# Patient Record
Sex: FEMALE | Race: WHITE | NOT HISPANIC OR LATINO | URBAN - METROPOLITAN AREA
[De-identification: names, ages, dates, MRNs, and addresses within clinical notes are randomized per-mention and may not be internally consistent; named-entity substitution may affect disease eponyms.]

---

## 2017-12-21 ENCOUNTER — EMERGENCY (EMERGENCY)
Facility: HOSPITAL | Age: 42
LOS: 0 days | Discharge: HOME | End: 2017-12-22

## 2017-12-21 DIAGNOSIS — R10.9 UNSPECIFIED ABDOMINAL PAIN: ICD-10-CM

## 2017-12-21 DIAGNOSIS — R19.7 DIARRHEA, UNSPECIFIED: ICD-10-CM

## 2017-12-21 DIAGNOSIS — J18.9 PNEUMONIA, UNSPECIFIED ORGANISM: ICD-10-CM

## 2017-12-21 DIAGNOSIS — R11.0 NAUSEA: ICD-10-CM

## 2017-12-21 PROBLEM — Z00.00 ENCOUNTER FOR PREVENTIVE HEALTH EXAMINATION: Status: ACTIVE | Noted: 2017-12-21

## 2018-01-11 ENCOUNTER — APPOINTMENT (OUTPATIENT)
Dept: SURGERY | Facility: CLINIC | Age: 43
End: 2018-01-11
Payer: MEDICAID

## 2018-01-11 VITALS — BODY MASS INDEX: 20.89 KG/M2 | HEIGHT: 66 IN | WEIGHT: 130 LBS

## 2018-01-11 DIAGNOSIS — K40.90 UNILATERAL INGUINAL HERNIA, W/OUT OBSTRUCTION OR GANGRENE, NOT SPECIFIED AS RECURRENT: ICD-10-CM

## 2018-01-11 PROCEDURE — 99203 OFFICE O/P NEW LOW 30 MIN: CPT

## 2018-02-19 ENCOUNTER — APPOINTMENT (OUTPATIENT)
Dept: SURGERY | Facility: AMBULATORY SURGERY CENTER | Age: 43
End: 2018-02-19

## 2018-02-27 ENCOUNTER — APPOINTMENT (OUTPATIENT)
Dept: SURGERY | Facility: CLINIC | Age: 43
End: 2018-02-27

## 2018-03-26 ENCOUNTER — APPOINTMENT (OUTPATIENT)
Dept: SURGERY | Facility: AMBULATORY SURGERY CENTER | Age: 43
End: 2018-03-26

## 2018-08-03 ENCOUNTER — APPOINTMENT (OUTPATIENT)
Dept: GASTROENTEROLOGY | Facility: CLINIC | Age: 43
End: 2018-08-03

## 2018-08-31 ENCOUNTER — EMERGENCY (EMERGENCY)
Facility: HOSPITAL | Age: 43
LOS: 0 days | Discharge: HOME | End: 2018-08-31
Attending: EMERGENCY MEDICINE | Admitting: EMERGENCY MEDICINE

## 2018-08-31 VITALS
HEART RATE: 81 BPM | RESPIRATION RATE: 18 BRPM | TEMPERATURE: 98 F | SYSTOLIC BLOOD PRESSURE: 176 MMHG | DIASTOLIC BLOOD PRESSURE: 114 MMHG | OXYGEN SATURATION: 98 %

## 2018-08-31 VITALS
RESPIRATION RATE: 18 BRPM | DIASTOLIC BLOOD PRESSURE: 78 MMHG | TEMPERATURE: 98 F | HEART RATE: 82 BPM | SYSTOLIC BLOOD PRESSURE: 140 MMHG | OXYGEN SATURATION: 98 %

## 2018-08-31 DIAGNOSIS — D25.9 LEIOMYOMA OF UTERUS, UNSPECIFIED: ICD-10-CM

## 2018-08-31 DIAGNOSIS — K29.60 OTHER GASTRITIS WITHOUT BLEEDING: ICD-10-CM

## 2018-08-31 DIAGNOSIS — R10.9 UNSPECIFIED ABDOMINAL PAIN: ICD-10-CM

## 2018-08-31 DIAGNOSIS — R11.0 NAUSEA: ICD-10-CM

## 2018-08-31 LAB
ALBUMIN SERPL ELPH-MCNC: 4.8 G/DL — SIGNIFICANT CHANGE UP (ref 3.5–5.2)
ALP SERPL-CCNC: 66 U/L — SIGNIFICANT CHANGE UP (ref 30–115)
ALT FLD-CCNC: 18 U/L — SIGNIFICANT CHANGE UP (ref 0–41)
ANION GAP SERPL CALC-SCNC: 18 MMOL/L — HIGH (ref 7–14)
APPEARANCE UR: ABNORMAL
AST SERPL-CCNC: 17 U/L — SIGNIFICANT CHANGE UP (ref 0–41)
BACTERIA # UR AUTO: ABNORMAL /HPF
BASOPHILS # BLD AUTO: 0.05 K/UL — SIGNIFICANT CHANGE UP (ref 0–0.2)
BASOPHILS NFR BLD AUTO: 0.6 % — SIGNIFICANT CHANGE UP (ref 0–1)
BILIRUB DIRECT SERPL-MCNC: 0.2 MG/DL — SIGNIFICANT CHANGE UP (ref 0–0.2)
BILIRUB INDIRECT FLD-MCNC: 0.4 MG/DL — SIGNIFICANT CHANGE UP (ref 0.2–1.2)
BILIRUB SERPL-MCNC: 0.6 MG/DL — SIGNIFICANT CHANGE UP (ref 0.2–1.2)
BILIRUB UR-MCNC: NEGATIVE — SIGNIFICANT CHANGE UP
BUN SERPL-MCNC: 12 MG/DL — SIGNIFICANT CHANGE UP (ref 10–20)
CALCIUM SERPL-MCNC: 9.6 MG/DL — SIGNIFICANT CHANGE UP (ref 8.5–10.1)
CHLORIDE SERPL-SCNC: 100 MMOL/L — SIGNIFICANT CHANGE UP (ref 98–110)
CO2 SERPL-SCNC: 24 MMOL/L — SIGNIFICANT CHANGE UP (ref 17–32)
COLOR SPEC: YELLOW — SIGNIFICANT CHANGE UP
CREAT SERPL-MCNC: 0.8 MG/DL — SIGNIFICANT CHANGE UP (ref 0.7–1.5)
DIFF PNL FLD: NEGATIVE — SIGNIFICANT CHANGE UP
EOSINOPHIL # BLD AUTO: 0.19 K/UL — SIGNIFICANT CHANGE UP (ref 0–0.7)
EOSINOPHIL NFR BLD AUTO: 2.3 % — SIGNIFICANT CHANGE UP (ref 0–8)
EPI CELLS # UR: ABNORMAL /HPF
GLUCOSE SERPL-MCNC: 106 MG/DL — HIGH (ref 70–99)
GLUCOSE UR QL: NEGATIVE — SIGNIFICANT CHANGE UP
HCT VFR BLD CALC: 40.3 % — SIGNIFICANT CHANGE UP (ref 37–47)
HGB BLD-MCNC: 13.4 G/DL — SIGNIFICANT CHANGE UP (ref 12–16)
IMM GRANULOCYTES NFR BLD AUTO: 0.2 % — SIGNIFICANT CHANGE UP (ref 0.1–0.3)
KETONES UR-MCNC: 15
LACTATE SERPL-SCNC: 0.5 MMOL/L — SIGNIFICANT CHANGE UP (ref 0.5–2.2)
LEUKOCYTE ESTERASE UR-ACNC: NEGATIVE — SIGNIFICANT CHANGE UP
LIDOCAIN IGE QN: 18 U/L — SIGNIFICANT CHANGE UP (ref 7–60)
LYMPHOCYTES # BLD AUTO: 1.45 K/UL — SIGNIFICANT CHANGE UP (ref 1.2–3.4)
LYMPHOCYTES # BLD AUTO: 17.5 % — LOW (ref 20.5–51.1)
MAGNESIUM SERPL-MCNC: 2.2 MG/DL — SIGNIFICANT CHANGE UP (ref 1.8–2.4)
MCHC RBC-ENTMCNC: 29.1 PG — SIGNIFICANT CHANGE UP (ref 27–31)
MCHC RBC-ENTMCNC: 33.3 G/DL — SIGNIFICANT CHANGE UP (ref 32–37)
MCV RBC AUTO: 87.6 FL — SIGNIFICANT CHANGE UP (ref 81–99)
MONOCYTES # BLD AUTO: 0.47 K/UL — SIGNIFICANT CHANGE UP (ref 0.1–0.6)
MONOCYTES NFR BLD AUTO: 5.7 % — SIGNIFICANT CHANGE UP (ref 1.7–9.3)
NEUTROPHILS # BLD AUTO: 6.09 K/UL — SIGNIFICANT CHANGE UP (ref 1.4–6.5)
NEUTROPHILS NFR BLD AUTO: 73.7 % — SIGNIFICANT CHANGE UP (ref 42.2–75.2)
NITRITE UR-MCNC: NEGATIVE — SIGNIFICANT CHANGE UP
NRBC # BLD: 0 /100 WBCS — SIGNIFICANT CHANGE UP (ref 0–0)
PH UR: 6 — SIGNIFICANT CHANGE UP (ref 5–8)
PLATELET # BLD AUTO: 221 K/UL — SIGNIFICANT CHANGE UP (ref 130–400)
POTASSIUM SERPL-MCNC: 4.3 MMOL/L — SIGNIFICANT CHANGE UP (ref 3.5–5)
POTASSIUM SERPL-SCNC: 4.3 MMOL/L — SIGNIFICANT CHANGE UP (ref 3.5–5)
PROT SERPL-MCNC: 7.5 G/DL — SIGNIFICANT CHANGE UP (ref 6–8)
PROT UR-MCNC: 30
RBC # BLD: 4.6 M/UL — SIGNIFICANT CHANGE UP (ref 4.2–5.4)
RBC # FLD: 13.1 % — SIGNIFICANT CHANGE UP (ref 11.5–14.5)
SODIUM SERPL-SCNC: 142 MMOL/L — SIGNIFICANT CHANGE UP (ref 135–146)
SP GR SPEC: >=1.03 — SIGNIFICANT CHANGE UP (ref 1.01–1.03)
UROBILINOGEN FLD QL: 0.2 — SIGNIFICANT CHANGE UP (ref 0.2–0.2)
WBC # BLD: 8.27 K/UL — SIGNIFICANT CHANGE UP (ref 4.8–10.8)
WBC # FLD AUTO: 8.27 K/UL — SIGNIFICANT CHANGE UP (ref 4.8–10.8)

## 2018-08-31 RX ORDER — ACETAMINOPHEN 500 MG
650 TABLET ORAL ONCE
Qty: 0 | Refills: 0 | Status: COMPLETED | OUTPATIENT
Start: 2018-08-31 | End: 2018-08-31

## 2018-08-31 RX ORDER — KETOROLAC TROMETHAMINE 30 MG/ML
15 SYRINGE (ML) INJECTION ONCE
Qty: 0 | Refills: 0 | Status: DISCONTINUED | OUTPATIENT
Start: 2018-08-31 | End: 2018-08-31

## 2018-08-31 RX ADMIN — Medication 650 MILLIGRAM(S): at 21:08

## 2018-08-31 RX ADMIN — Medication 15 MILLIGRAM(S): at 17:10

## 2018-08-31 RX ADMIN — Medication 15 MILLIGRAM(S): at 17:06

## 2018-08-31 NOTE — ED PROVIDER NOTE - NS ED ROS FT
ROS: No fever/chills, No headache/photophobia/eye pain/changes in vision, No ear pain/sore throat/dysphagia, No chest pain/palpitations, no SOB/cough/wheeze/stridor, No /V/D/melena, no dysuria/frequency/discharge, No neck/back pain, no rash, no changes in neurological status/function.    +abdominal pain

## 2018-08-31 NOTE — ED PROVIDER NOTE - CARE PLAN
Principal Discharge DX:	Uterine leiomyoma, unspecified location  Secondary Diagnosis:	Other gastritis without hemorrhage

## 2018-08-31 NOTE — ED ADULT NURSE NOTE - NSIMPLEMENTINTERV_GEN_ALL_ED
Implemented All Universal Safety Interventions:  [place] to call system. Call bell, personal items and telephone within reach. Instruct patient to call for assistance. Room bathroom lighting operational. Non-slip footwear when patient is off stretcher. Physically safe environment: no spills, clutter or unnecessary equipment. Stretcher in lowest position, wheels locked, appropriate side rails in place.

## 2018-08-31 NOTE — ED PROVIDER NOTE - OBJECTIVE STATEMENT
42y/o F w/ hx HPV and recent UTI (last cipro dose yesterday) presents w/ crampy abdominal pain on and off for 1 month, now steady since yesterday. pt denies dysuria, freq.  pain did not improve w/ antibiotics.  Pt denies nausea, vomiting, diarrhea.  pt denies trauma, only social alcohol abuse. LMP 3 weeks ago, sexually active, no hx of stds.  recent work up by GYN diagnosed her with hpv.  no blood in urine

## 2018-08-31 NOTE — ED PROVIDER NOTE - PROGRESS NOTE DETAILS
I personally evaluated the patient. I reviewed the Resident’s note (as assigned above), and agree with the findings and plan except as documented in my note.   44 y/o F PMHx HPV and recent UTI presents with crampy abdominal pain. Pt notes she was having lower abdominal pain for about 1 month and went to see her GYN who DX with UTI last week. Pt took Cipro x7 days but notes still having this crampy pain. Pt notes some nausea, but no vomiting, fevers, or chills. No trauma. Currently with no urinary SX. Denies anorexia, change in stool habits, or similar pain in past. LMP x3 weeks ago and normal. PE: Well appearing, NAD. VS noted. Abdomen soft, diffuse  TTP, no rebound or guarding. No CVAT. Pt had full GYN exam with her GYN last week. A/P: Labs, CT, US, urine, and reassess. Results discussed with patient, copy of results provided. Patient understands to follow up with her gynecologist.

## 2018-08-31 NOTE — ED PROVIDER NOTE - MEDICAL DECISION MAKING DETAILS
labs and imaging discussed with pt at length, pt given print outs of labs and imaging. Aware to follow up with gyn and gi, return precautions discussed at length, pt understands and agrees with plan

## 2018-08-31 NOTE — ED ADULT NURSE NOTE - OBJECTIVE STATEMENT
Pt complaining of dx of uti with completion of po abx cipro for 7 days. Pt now complains of increased pain to lower abdomen and left flank with mild nausea.

## 2018-08-31 NOTE — ED ADULT TRIAGE NOTE - CHIEF COMPLAINT QUOTE
patient states she had a UTI completed 7 days of cipro, patient c/o lower back pain and abdominal pain

## 2018-09-01 LAB
CULTURE RESULTS: NO GROWTH — SIGNIFICANT CHANGE UP
SPECIMEN SOURCE: SIGNIFICANT CHANGE UP

## 2018-10-18 ENCOUNTER — OUTPATIENT (OUTPATIENT)
Dept: OUTPATIENT SERVICES | Facility: HOSPITAL | Age: 43
LOS: 1 days | Discharge: HOME | End: 2018-10-18

## 2018-10-18 ENCOUNTER — RESULT REVIEW (OUTPATIENT)
Age: 43
End: 2018-10-18

## 2018-10-18 VITALS
SYSTOLIC BLOOD PRESSURE: 132 MMHG | RESPIRATION RATE: 18 BRPM | WEIGHT: 132.06 LBS | HEIGHT: 66 IN | TEMPERATURE: 98 F | HEART RATE: 72 BPM | DIASTOLIC BLOOD PRESSURE: 100 MMHG

## 2018-10-18 VITALS — DIASTOLIC BLOOD PRESSURE: 70 MMHG | RESPIRATION RATE: 18 BRPM | SYSTOLIC BLOOD PRESSURE: 145 MMHG | HEART RATE: 78 BPM

## 2018-10-18 NOTE — H&P ADULT - ASSESSMENT
43 year old female is here for elective EGD with EUS for evaluation for an antral subepithelial lesion.

## 2018-10-18 NOTE — ASU DISCHARGE PLAN (ADULT/PEDIATRIC). - NOTIFY
Persistent Nausea and Vomiting/Fever greater than 101/Bleeding that does not stop/Inability to Tolerate Liquids or Foods/Pain not relieved by Medications/Excessive Diarrhea

## 2018-10-19 ENCOUNTER — RESULT REVIEW (OUTPATIENT)
Age: 43
End: 2018-10-19

## 2018-10-22 LAB — SURGICAL PATHOLOGY STUDY: SIGNIFICANT CHANGE UP

## 2018-10-23 LAB — NON-GYNECOLOGICAL CYTOLOGY STUDY: SIGNIFICANT CHANGE UP

## 2018-10-31 PROBLEM — K21.9 GASTRO-ESOPHAGEAL REFLUX DISEASE WITHOUT ESOPHAGITIS: Chronic | Status: ACTIVE | Noted: 2018-10-18

## 2018-10-31 PROBLEM — K25.9 GASTRIC ULCER, UNSPECIFIED AS ACUTE OR CHRONIC, WITHOUT HEMORRHAGE OR PERFORATION: Chronic | Status: ACTIVE | Noted: 2018-10-18

## 2018-11-02 DIAGNOSIS — K29.60 OTHER GASTRITIS WITHOUT BLEEDING: ICD-10-CM

## 2018-11-02 DIAGNOSIS — F10.10 ALCOHOL ABUSE, UNCOMPLICATED: ICD-10-CM

## 2018-11-02 DIAGNOSIS — K25.9 GASTRIC ULCER, UNSPECIFIED AS ACUTE OR CHRONIC, WITHOUT HEMORRHAGE OR PERFORATION: ICD-10-CM

## 2018-11-02 DIAGNOSIS — K31.89 OTHER DISEASES OF STOMACH AND DUODENUM: ICD-10-CM

## 2018-11-05 ENCOUNTER — APPOINTMENT (OUTPATIENT)
Dept: SURGERY | Facility: CLINIC | Age: 43
End: 2018-11-05
Payer: MEDICAID

## 2018-11-05 VITALS
DIASTOLIC BLOOD PRESSURE: 78 MMHG | TEMPERATURE: 98.6 F | WEIGHT: 140 LBS | HEIGHT: 66 IN | HEART RATE: 69 BPM | BODY MASS INDEX: 22.5 KG/M2 | SYSTOLIC BLOOD PRESSURE: 130 MMHG

## 2018-11-05 PROCEDURE — 99213 OFFICE O/P EST LOW 20 MIN: CPT

## 2018-11-05 RX ORDER — PANTOPRAZOLE 40 MG/1
40 TABLET, DELAYED RELEASE ORAL
Refills: 0 | Status: ACTIVE | COMMUNITY

## 2019-07-25 ENCOUNTER — TRANSCRIPTION ENCOUNTER (OUTPATIENT)
Age: 44
End: 2019-07-25

## 2019-10-30 ENCOUNTER — TRANSCRIPTION ENCOUNTER (OUTPATIENT)
Age: 44
End: 2019-10-30

## 2019-12-05 ENCOUNTER — TRANSCRIPTION ENCOUNTER (OUTPATIENT)
Age: 44
End: 2019-12-05

## 2020-02-05 ENCOUNTER — RX RENEWAL (OUTPATIENT)
Age: 45
End: 2020-02-05

## 2020-02-26 ENCOUNTER — APPOINTMENT (OUTPATIENT)
Dept: GASTROENTEROLOGY | Facility: CLINIC | Age: 45
End: 2020-02-26

## 2020-07-13 ENCOUNTER — TRANSCRIPTION ENCOUNTER (OUTPATIENT)
Age: 45
End: 2020-07-13

## 2020-09-01 ENCOUNTER — TRANSCRIPTION ENCOUNTER (OUTPATIENT)
Age: 45
End: 2020-09-01

## 2021-04-19 ENCOUNTER — TRANSCRIPTION ENCOUNTER (OUTPATIENT)
Age: 46
End: 2021-04-19

## 2021-05-18 ENCOUNTER — TRANSCRIPTION ENCOUNTER (OUTPATIENT)
Age: 46
End: 2021-05-18

## 2021-06-17 ENCOUNTER — TRANSCRIPTION ENCOUNTER (OUTPATIENT)
Age: 46
End: 2021-06-17

## 2022-01-03 NOTE — ASU PATIENT PROFILE, ADULT - PRO ARRIVE FROM
Problem: Adult Inpatient Plan of Care  Goal: Plan of Care Review  Outcome: Ongoing, Progressing  Flowsheets (Taken 1/3/2022 1822)  Progress: improving  Plan of Care Reviewed With: patient  Outcome Summary:   Pt is on 4L humidified NC, maintaining SATS in the 90's   pt worked with PT/ST today with no improvement, pt remains very weak   hand wound is improving   pt maintains high spirits and knows it will just take tie to get stronger   will continue to monitor/encourage pt.   Goal Outcome Evaluation:  Plan of Care Reviewed With: patient        Progress: improving  Outcome Summary: Pt is on 4L humidified NC, maintaining SATS in the 90's; pt worked with PT/ST today with no improvement, pt remains very weak; hand wound is improving; pt maintains high spirits and knows it will just take tie to get stronger; will continue to monitor/encourage pt.   home

## 2022-03-22 RX ORDER — PANTOPRAZOLE SODIUM 40 MG/1
40 TABLET, DELAYED RELEASE ORAL
Refills: 0 | Status: DISCONTINUED | COMMUNITY
End: 2022-03-22

## 2022-03-29 ENCOUNTER — APPOINTMENT (OUTPATIENT)
Dept: GASTROENTEROLOGY | Facility: CLINIC | Age: 47
End: 2022-03-29
Payer: MEDICAID

## 2022-03-29 DIAGNOSIS — R11.0 NAUSEA: ICD-10-CM

## 2022-03-29 DIAGNOSIS — Z78.9 OTHER SPECIFIED HEALTH STATUS: ICD-10-CM

## 2022-03-29 DIAGNOSIS — Z80.3 FAMILY HISTORY OF MALIGNANT NEOPLASM OF BREAST: ICD-10-CM

## 2022-03-29 DIAGNOSIS — Z86.39 PERSONAL HISTORY OF OTHER ENDOCRINE, NUTRITIONAL AND METABOLIC DISEASE: ICD-10-CM

## 2022-03-29 DIAGNOSIS — R10.13 EPIGASTRIC PAIN: ICD-10-CM

## 2022-03-29 DIAGNOSIS — K25.9 GASTRIC ULCER, UNSPECIFIED AS ACUTE OR CHRONIC, W/OUT HEMORRHAGE OR PERFORATION: ICD-10-CM

## 2022-03-29 DIAGNOSIS — Z12.11 ENCOUNTER FOR SCREENING FOR MALIGNANT NEOPLASM OF COLON: ICD-10-CM

## 2022-03-29 PROCEDURE — 99204 OFFICE O/P NEW MOD 45 MIN: CPT | Mod: 95

## 2022-03-29 RX ORDER — NORETHINDRONE ACETATE AND ETHINYL ESTRADIOL AND FERROUS FUMARATE 1MG-20(24)
KIT ORAL
Refills: 0 | Status: ACTIVE | COMMUNITY

## 2022-03-29 RX ORDER — LEVOTHYROXINE SODIUM 0.05 MG/1
50 TABLET ORAL
Refills: 0 | Status: ACTIVE | COMMUNITY

## 2022-03-29 NOTE — ASSESSMENT
[FreeTextEntry1] : 46 year old female patient average risk for CRC, presents with few months history of epigastric pain that often woke her up at night, has had EGDs with [facility] Mercado and Mendoza and a negative EUS for malignancy. Also had equivocal result for H Pylori. \par No weight loss, dysphagia, but has GERD responsive to PPI. \par Reports occasional rectal bleeding due to a hemorrhoid. Never had colonoscopy. \par \par CRC screening colonoscopy\par Hx of non healing gastric ulcers, now with epigastric pain\par Needs EGD, screening colonoscopy\par Risks and benefits discussed with patient.\par Hemorrhoids: anusol HC suppo daily at [facility]\par

## 2022-03-29 NOTE — PHYSICAL EXAM
[General Appearance - Alert] : alert [Hearing Threshold Finger Rub Not [place]] : hearing was normal [] : no respiratory distress [Skin Color & Pigmentation] : normal skin color and pigmentation [Oriented To Time, Place, And Person] : oriented to person, place, and time

## 2022-03-29 NOTE — HISTORY OF PRESENT ILLNESS
[Home] : at home, [unfilled] , at the time of the visit. [Medical Office: ([facility])___] : at the medical office located in  [Verbal consent obtained from patient] : the patient, [unfilled] [FreeTextEntry4] : Krupa Herrera  [de-identified] : 46 year old female patient average risk for CRC, presents with few months history of epigastric pain that often woke her up at night, has had EGDs with Rocky Mercado and Mendoza and a negative EUS for malignancy. Also had equivocal result for H Pylori. \par No weight loss, dysphagia, but has GERD responsive to PPI. \par Reports occasional rectal bleeding due to a hemorrhoid. Never had colonoscopy.

## 2022-04-13 ENCOUNTER — TRANSCRIPTION ENCOUNTER (OUTPATIENT)
Age: 47
End: 2022-04-13

## 2022-06-24 ENCOUNTER — LABORATORY RESULT (OUTPATIENT)
Age: 47
End: 2022-06-24

## 2022-06-27 ENCOUNTER — RESULT REVIEW (OUTPATIENT)
Age: 47
End: 2022-06-27

## 2022-06-27 ENCOUNTER — TRANSCRIPTION ENCOUNTER (OUTPATIENT)
Age: 47
End: 2022-06-27

## 2022-06-27 ENCOUNTER — OUTPATIENT (OUTPATIENT)
Dept: OUTPATIENT SERVICES | Facility: HOSPITAL | Age: 47
LOS: 1 days | Discharge: HOME | End: 2022-06-27
Payer: MEDICAID

## 2022-06-27 VITALS
HEART RATE: 77 BPM | SYSTOLIC BLOOD PRESSURE: 133 MMHG | TEMPERATURE: 97 F | DIASTOLIC BLOOD PRESSURE: 81 MMHG | WEIGHT: 130.07 LBS | RESPIRATION RATE: 17 BRPM | OXYGEN SATURATION: 99 % | HEIGHT: 65 IN

## 2022-06-27 VITALS — SYSTOLIC BLOOD PRESSURE: 112 MMHG | RESPIRATION RATE: 15 BRPM | DIASTOLIC BLOOD PRESSURE: 59 MMHG | HEART RATE: 70 BPM

## 2022-06-27 PROCEDURE — 45380 COLONOSCOPY AND BIOPSY: CPT

## 2022-06-27 PROCEDURE — 43239 EGD BIOPSY SINGLE/MULTIPLE: CPT | Mod: XS

## 2022-06-27 PROCEDURE — 88312 SPECIAL STAINS GROUP 1: CPT | Mod: 26

## 2022-06-27 PROCEDURE — 88305 TISSUE EXAM BY PATHOLOGIST: CPT | Mod: 26

## 2022-06-27 NOTE — ASU PATIENT PROFILE, ADULT - NSICDXPASTMEDICALHX_GEN_ALL_CORE_FT
PAST MEDICAL HISTORY:  Autoimmune deficiency syndrome     Gastric ulcer     GERD (gastroesophageal reflux disease)     Hypothyroidism

## 2022-06-27 NOTE — ASU DISCHARGE PLAN (ADULT/PEDIATRIC) - CARE PROVIDER_API CALL
Edwina Chavarria (MD)  Gastroenterology  [address] [place] [address]  Phone: [phone]  Fax: [phone]  Follow Up Time:

## 2022-06-27 NOTE — ASU PATIENT PROFILE, ADULT - FALL HARM RISK - HARM RISK INTERVENTIONS

## 2022-07-04 LAB — SURGICAL PATHOLOGY STUDY: SIGNIFICANT CHANGE UP

## 2022-07-07 LAB — SURGICAL PATHOLOGY STUDY: SIGNIFICANT CHANGE UP

## 2022-07-08 DIAGNOSIS — K92.1 MELENA: ICD-10-CM

## 2022-07-08 DIAGNOSIS — K29.80 DUODENITIS WITHOUT BLEEDING: ICD-10-CM

## 2022-07-08 DIAGNOSIS — Z87.11 PERSONAL HISTORY OF PEPTIC ULCER DISEASE: ICD-10-CM

## 2022-07-08 DIAGNOSIS — K29.50 UNSPECIFIED CHRONIC GASTRITIS WITHOUT BLEEDING: ICD-10-CM

## 2022-07-08 DIAGNOSIS — K64.8 OTHER HEMORRHOIDS: ICD-10-CM

## 2022-07-08 DIAGNOSIS — K21.9 GASTRO-ESOPHAGEAL REFLUX DISEASE WITHOUT ESOPHAGITIS: ICD-10-CM

## 2022-09-01 ENCOUNTER — APPOINTMENT (OUTPATIENT)
Dept: GASTROENTEROLOGY | Facility: CLINIC | Age: 47
End: 2022-09-01

## 2022-09-01 DIAGNOSIS — R10.9 UNSPECIFIED ABDOMINAL PAIN: ICD-10-CM

## 2022-09-01 PROBLEM — E03.9 HYPOTHYROIDISM, UNSPECIFIED: Chronic | Status: ACTIVE | Noted: 2022-06-27

## 2022-09-01 PROBLEM — B20 HUMAN IMMUNODEFICIENCY VIRUS [HIV] DISEASE: Chronic | Status: ACTIVE | Noted: 2022-06-27

## 2022-09-01 PROCEDURE — 99214 OFFICE O/P EST MOD 30 MIN: CPT | Mod: 95

## 2022-09-01 RX ORDER — SODIUM SULFATE, MAGNESIUM SULFATE, AND POTASSIUM CHLORIDE 17.75; 2.7; 2.25 G/1; G/1; G/1
1479-225-188 TABLET ORAL
Qty: 24 | Refills: 0 | Status: DISCONTINUED | COMMUNITY
Start: 2022-03-29 | End: 2022-09-01

## 2022-09-01 RX ORDER — ONDANSETRON 8 MG/1
8 TABLET ORAL
Qty: 3 | Refills: 0 | Status: DISCONTINUED | COMMUNITY
Start: 2022-03-29 | End: 2022-09-01

## 2022-09-01 RX ORDER — HYDROCORTISONE ACETATE 25 MG/1
25 SUPPOSITORY RECTAL
Qty: 30 | Refills: 6 | Status: DISCONTINUED | COMMUNITY
Start: 2022-03-29 | End: 2022-09-01

## 2022-09-01 NOTE — HISTORY OF PRESENT ILLNESS
[Home] : at home, [unfilled] , at the time of the visit. [Verbal consent obtained from patient] : the patient, [unfilled] [Medical Office: ([facility])___] : at the medical office located in  [FreeTextEntry4] : Krupa Herrera [de-identified] : 46 year old female patient average risk for CRC, presents with few months history of epigastric pain that often woke her up at night, has had EGDs with Rocky Mercado and Mendoza and a negative EUS for malignancy. Also had equivocal result for H Pylori. \par No weight loss, dysphagia, but has GERD responsive to PPI. \par Reports occasional rectal bleeding due to a hemorrhoid. Never had colonoscopy. \par s/p unremarkable EGD, colonoscopy in june 2022\par 9/1/2022\par Now comes in w 2 weeks hx of left sided abdominal pain radiating to the back,. sharp in nature, associated with diarrhea, occasionally bloody. \par

## 2022-09-01 NOTE — ASSESSMENT
[FreeTextEntry1] : 46 year old female patient average risk for CRC, presents with few months history of epigastric pain that often woke her up at night, has had EGDs with Rocky Mercado and Mendoza and a negative EUS for malignancy. Also had equivocal result for H Pylori. \par No weight loss, dysphagia, but has GERD responsive to PPI. \par Reports occasional rectal bleeding due to a hemorrhoid. Never had colonoscopy. \par s/p unremarkable EGD, colonoscopy in june 2022\par 9/1/2022\par Now comes in w 2 weeks hx of left sided abdominal pain radiating to the back,. sharp in nature, associated with diarrhea, occasionally bloody. No weight loss or vomiting., No night symtpoms. \par \par \par \par Likely IBS-D, other intra abdominal pathologies to be ruled out\par GERD\par Hemorrhoids: anusol HC suppo daily at [facility]\par \par Rec: CT scan A/P with contrast \par CMP\par bentyl tid\par famotidine 40 bid\par Pt to call for results\par

## 2022-09-20 ENCOUNTER — OUTPATIENT (OUTPATIENT)
Dept: OUTPATIENT SERVICES | Facility: HOSPITAL | Age: 47
LOS: 1 days | Discharge: HOME | End: 2022-09-20

## 2022-09-20 DIAGNOSIS — R93.5 ABNORMAL FINDINGS ON DIAGNOSTIC IMAGING OF OTHER ABDOMINAL REGIONS, INCLUDING RETROPERITONEUM: ICD-10-CM

## 2022-09-20 LAB — GLUCOSE BLDC GLUCOMTR-MCNC: 70 MG/DL — SIGNIFICANT CHANGE UP (ref 70–99)

## 2022-09-20 PROCEDURE — 78815 PET IMAGE W/CT SKULL-THIGH: CPT | Mod: 26,PI

## 2022-09-28 ENCOUNTER — OUTPATIENT (OUTPATIENT)
Dept: OUTPATIENT SERVICES | Facility: HOSPITAL | Age: 47
LOS: 1 days | Discharge: HOME | End: 2022-09-28

## 2022-09-28 VITALS
SYSTOLIC BLOOD PRESSURE: 160 MMHG | HEART RATE: 74 BPM | HEIGHT: 65 IN | DIASTOLIC BLOOD PRESSURE: 89 MMHG | WEIGHT: 130.07 LBS | RESPIRATION RATE: 16 BRPM | OXYGEN SATURATION: 99 % | TEMPERATURE: 97 F

## 2022-09-28 DIAGNOSIS — Z01.818 ENCOUNTER FOR OTHER PREPROCEDURAL EXAMINATION: ICD-10-CM

## 2022-09-28 DIAGNOSIS — D80.8 OTHER IMMUNODEFICIENCIES WITH PREDOMINANTLY ANTIBODY DEFECTS: ICD-10-CM

## 2022-09-28 LAB
ALBUMIN SERPL ELPH-MCNC: 4.3 G/DL — SIGNIFICANT CHANGE UP (ref 3.5–5.2)
ALP SERPL-CCNC: 73 U/L — SIGNIFICANT CHANGE UP (ref 30–115)
ALT FLD-CCNC: 12 U/L — SIGNIFICANT CHANGE UP (ref 0–41)
ANION GAP SERPL CALC-SCNC: 12 MMOL/L — SIGNIFICANT CHANGE UP (ref 7–14)
APTT BLD: 35.7 SEC — SIGNIFICANT CHANGE UP (ref 27–39.2)
AST SERPL-CCNC: 17 U/L — SIGNIFICANT CHANGE UP (ref 0–41)
BASOPHILS # BLD AUTO: 0.09 K/UL — SIGNIFICANT CHANGE UP (ref 0–0.2)
BASOPHILS NFR BLD AUTO: 1.3 % — HIGH (ref 0–1)
BILIRUB SERPL-MCNC: <0.2 MG/DL — SIGNIFICANT CHANGE UP (ref 0.2–1.2)
BUN SERPL-MCNC: 18 MG/DL — SIGNIFICANT CHANGE UP (ref 10–20)
CALCIUM SERPL-MCNC: 9.3 MG/DL — SIGNIFICANT CHANGE UP (ref 8.4–10.5)
CHLORIDE SERPL-SCNC: 99 MMOL/L — SIGNIFICANT CHANGE UP (ref 98–110)
CO2 SERPL-SCNC: 26 MMOL/L — SIGNIFICANT CHANGE UP (ref 17–32)
CREAT SERPL-MCNC: 0.6 MG/DL — LOW (ref 0.7–1.5)
EGFR: 110 ML/MIN/1.73M2 — SIGNIFICANT CHANGE UP
EOSINOPHIL # BLD AUTO: 0.27 K/UL — SIGNIFICANT CHANGE UP (ref 0–0.7)
EOSINOPHIL NFR BLD AUTO: 4 % — SIGNIFICANT CHANGE UP (ref 0–8)
GLUCOSE SERPL-MCNC: 73 MG/DL — SIGNIFICANT CHANGE UP (ref 70–99)
HCT VFR BLD CALC: 37.7 % — SIGNIFICANT CHANGE UP (ref 37–47)
HGB BLD-MCNC: 12 G/DL — SIGNIFICANT CHANGE UP (ref 12–16)
IMM GRANULOCYTES NFR BLD AUTO: 0.3 % — SIGNIFICANT CHANGE UP (ref 0.1–0.3)
INR BLD: 0.93 RATIO — SIGNIFICANT CHANGE UP (ref 0.65–1.3)
LYMPHOCYTES # BLD AUTO: 2.27 K/UL — SIGNIFICANT CHANGE UP (ref 1.2–3.4)
LYMPHOCYTES # BLD AUTO: 33.5 % — SIGNIFICANT CHANGE UP (ref 20.5–51.1)
MCHC RBC-ENTMCNC: 27.5 PG — SIGNIFICANT CHANGE UP (ref 27–31)
MCHC RBC-ENTMCNC: 31.8 G/DL — LOW (ref 32–37)
MCV RBC AUTO: 86.3 FL — SIGNIFICANT CHANGE UP (ref 81–99)
MONOCYTES # BLD AUTO: 0.41 K/UL — SIGNIFICANT CHANGE UP (ref 0.1–0.6)
MONOCYTES NFR BLD AUTO: 6.1 % — SIGNIFICANT CHANGE UP (ref 1.7–9.3)
NEUTROPHILS # BLD AUTO: 3.71 K/UL — SIGNIFICANT CHANGE UP (ref 1.4–6.5)
NEUTROPHILS NFR BLD AUTO: 54.8 % — SIGNIFICANT CHANGE UP (ref 42.2–75.2)
NRBC # BLD: 0 /100 WBCS — SIGNIFICANT CHANGE UP (ref 0–0)
PLATELET # BLD AUTO: 293 K/UL — SIGNIFICANT CHANGE UP (ref 130–400)
POTASSIUM SERPL-MCNC: 4.5 MMOL/L — SIGNIFICANT CHANGE UP (ref 3.5–5)
POTASSIUM SERPL-SCNC: 4.5 MMOL/L — SIGNIFICANT CHANGE UP (ref 3.5–5)
PROT SERPL-MCNC: 6.2 G/DL — SIGNIFICANT CHANGE UP (ref 6–8)
PROTHROM AB SERPL-ACNC: 10.7 SEC — SIGNIFICANT CHANGE UP (ref 9.95–12.87)
RBC # BLD: 4.37 M/UL — SIGNIFICANT CHANGE UP (ref 4.2–5.4)
RBC # FLD: 12.8 % — SIGNIFICANT CHANGE UP (ref 11.5–14.5)
SODIUM SERPL-SCNC: 137 MMOL/L — SIGNIFICANT CHANGE UP (ref 135–146)
WBC # BLD: 6.77 K/UL — SIGNIFICANT CHANGE UP (ref 4.8–10.8)
WBC # FLD AUTO: 6.77 K/UL — SIGNIFICANT CHANGE UP (ref 4.8–10.8)

## 2022-09-28 PROCEDURE — 93010 ELECTROCARDIOGRAM REPORT: CPT

## 2022-09-28 RX ORDER — NORETHINDRONE AND ETHINYL ESTRADIOL 0.4-0.035
1 KIT ORAL
Qty: 0 | Refills: 0 | DISCHARGE

## 2022-09-28 RX ORDER — FAMOTIDINE 10 MG/ML
1 INJECTION INTRAVENOUS
Qty: 0 | Refills: 0 | DISCHARGE

## 2022-09-28 RX ORDER — PANTOPRAZOLE SODIUM 20 MG/1
1 TABLET, DELAYED RELEASE ORAL
Qty: 0 | Refills: 0 | DISCHARGE

## 2022-09-28 RX ORDER — LEVOTHYROXINE SODIUM 125 MCG
1 TABLET ORAL
Qty: 0 | Refills: 0 | DISCHARGE

## 2022-09-28 NOTE — H&P PST ADULT - HISTORY OF PRESENT ILLNESS
PATIENT DENIES CHEST PAIN, SHORTNESS OF BREATH, PALPITATIONS, COUGHING, FEVER, DYSURIA.  CAN WALK UP 6 FLIGHTS OF STEPS WITHOUT SOB.    NO COUGH, FEVER, SORE THROAT, HEADACHE, LOSS OF TASTE OR SMELL. NO KNOWN EXPOSURE TO ANYONE WITH COVID. PATIENT WAS INSTRUCTED TO ISOLATE FROM NOW UNTIL THE SURGERY.    Anesthesia Alert  + Difficult Airway (CLASS IV)  NO--History of neck surgery or radiation  NO--Limited ROM of neck  NO--History of Malignant hyperthermia  NO--Personal or family history of Pseudocholinesterase deficiency  NO--Prior Anesthesia Complication  NO--Latex Allergy  NO--Loose teeth  NO--History of Rheumatoid Arthritis  NO--WINSTON  NO-bleeding risk

## 2022-09-28 NOTE — H&P PST ADULT - REASON FOR ADMISSION
48 Y/O FEMALE HERE FOR PRE-ADMISSION SURGICAL TESTING. PATIENT REPORTS SHE WAS HAVING STOMACH PAIN X2 WEEKS. CT SCAN 9/20/22 REVEALED + LYMPH NODES THROUGHOUT BODY. DR ISLAS PALPATED + LYMPH NODES TO B/L AXILLA. PET SCAN WAS NEG PER PT.  NOW FOR SCHEDULED AXILLARY LYMPH NODE BIOPSY.

## 2022-10-01 ENCOUNTER — LABORATORY RESULT (OUTPATIENT)
Age: 47
End: 2022-10-01

## 2022-10-04 ENCOUNTER — RESULT REVIEW (OUTPATIENT)
Age: 47
End: 2022-10-04

## 2022-10-04 ENCOUNTER — OUTPATIENT (OUTPATIENT)
Dept: OUTPATIENT SERVICES | Facility: HOSPITAL | Age: 47
LOS: 1 days | Discharge: HOME | End: 2022-10-04

## 2022-10-04 PROCEDURE — 76942 ECHO GUIDE FOR BIOPSY: CPT | Mod: 26

## 2022-10-04 PROCEDURE — 88189 FLOWCYTOMETRY/READ 16 & >: CPT

## 2022-10-04 PROCEDURE — 38505 NEEDLE BIOPSY LYMPH NODES: CPT

## 2022-10-04 PROCEDURE — 88360 TUMOR IMMUNOHISTOCHEM/MANUAL: CPT | Mod: 26

## 2022-10-04 PROCEDURE — 88307 TISSUE EXAM BY PATHOLOGIST: CPT | Mod: 26

## 2022-10-04 PROCEDURE — 88173 CYTOPATH EVAL FNA REPORT: CPT | Mod: 26

## 2022-10-04 PROCEDURE — 88342 IMHCHEM/IMCYTCHM 1ST ANTB: CPT | Mod: 26,59

## 2022-10-04 PROCEDURE — 88341 IMHCHEM/IMCYTCHM EA ADD ANTB: CPT | Mod: 26,59

## 2022-10-04 NOTE — PROGRESS NOTE ADULT - SUBJECTIVE AND OBJECTIVE BOX
Interventional Radiology Outpatient Documentation    PREOPERATIVE DAY OF PROCEDURE EVALUATION:     I have personally seen and examined this patient. I agree with the history and physical which I have reviewed and noted any changes below:     Plan is for biopsy of FDG avid right axillary lymph node  (Signed electronically Carolyn Otto MD) 10-04-22 @ 0800    Procedure/ risks/ benefits/ goals/ alternatives were explained. All questions answered. Informed content obtained from patient. Consent placed in chart.     POSTOPERATIVE PROCEDURAL EVALUATION:     Procedure: US guided biopsy of right axillary lymph nodes    Pre-Op Diagnosis: FDG avid lymphadenopathy    Post-Op Diagnosis: Same    Attending: Carolyn  Resident: Sun    Anesthesia (type):  [ ] General Anesthesia  [ ] Sedation  [ ] Spinal Anesthesia  [ x] Local/Regional    Contrast: None    Estimated Blood Loss: Minimal, < 5 cc    Condition:   [ ] Critical  [ ] Serious  [ ] Fair   [ x] Good    Findings/Follow up Plan of Care:  4 18 guage core needle biopsy  3 FNA    See full report in pacs    Specimens Removed: None    Implants: None    Complications: None    Disposition: Recovery, then home

## 2022-10-05 LAB
NON-GYNECOLOGICAL CYTOLOGY STUDY: SIGNIFICANT CHANGE UP
TM INTERPRETATION: SIGNIFICANT CHANGE UP

## 2022-10-11 DIAGNOSIS — R59.0 LOCALIZED ENLARGED LYMPH NODES: ICD-10-CM

## 2022-10-21 LAB — SURGICAL PATHOLOGY STUDY: SIGNIFICANT CHANGE UP

## 2023-01-23 RX ORDER — FAMOTIDINE 40 MG/1
40 TABLET, FILM COATED ORAL
Refills: 0 | Status: DISCONTINUED | COMMUNITY
End: 2023-01-23

## 2023-01-23 RX ORDER — FAMOTIDINE 40 MG/1
40 TABLET, FILM COATED ORAL
Qty: 60 | Refills: 6 | Status: DISCONTINUED | COMMUNITY
Start: 2022-09-01 | End: 2023-01-23

## 2023-01-26 ENCOUNTER — APPOINTMENT (OUTPATIENT)
Dept: GASTROENTEROLOGY | Facility: CLINIC | Age: 48
End: 2023-01-26
Payer: MEDICAID

## 2023-01-26 DIAGNOSIS — R19.7 DIARRHEA, UNSPECIFIED: ICD-10-CM

## 2023-01-26 DIAGNOSIS — K58.9 IRRITABLE BOWEL SYNDROME W/OUT DIARRHEA: ICD-10-CM

## 2023-01-26 DIAGNOSIS — Z80.3 FAMILY HISTORY OF MALIGNANT NEOPLASM OF BREAST: ICD-10-CM

## 2023-01-26 DIAGNOSIS — D64.9 ANEMIA, UNSPECIFIED: ICD-10-CM

## 2023-01-26 DIAGNOSIS — K64.4 RESIDUAL HEMORRHOIDAL SKIN TAGS: ICD-10-CM

## 2023-01-26 DIAGNOSIS — D50.9 IRON DEFICIENCY ANEMIA, UNSPECIFIED: ICD-10-CM

## 2023-01-26 PROCEDURE — 99213 OFFICE O/P EST LOW 20 MIN: CPT | Mod: 95

## 2023-01-26 RX ORDER — FAMOTIDINE 40 MG/1
40 TABLET, FILM COATED ORAL
Refills: 0 | Status: ACTIVE | COMMUNITY

## 2023-01-26 RX ORDER — DICYCLOMINE HYDROCHLORIDE 20 MG/1
20 TABLET ORAL
Qty: 90 | Refills: 6 | Status: ACTIVE | COMMUNITY
Start: 2023-01-26 | End: 1900-01-01

## 2023-01-26 RX ORDER — PANTOPRAZOLE 40 MG/1
40 TABLET, DELAYED RELEASE ORAL DAILY
Qty: 30 | Refills: 6 | Status: ACTIVE | COMMUNITY
Start: 2023-01-26 | End: 1900-01-01

## 2023-01-26 NOTE — PHYSICAL EXAM
[Alert] : alert [Sclera] : the sclera and conjunctiva were normal [Hearing Threshold Finger Rub Not [place]] : hearing was normal [No Respiratory Distress] : no respiratory distress [Normal Color / Pigmentation] : normal skin color and pigmentation [Oriented To Time, Place, And Person] : oriented to person, place, and time

## 2023-01-26 NOTE — ASSESSMENT
[FreeTextEntry1] : 46 year old female patient average risk for CRC, presents with few months history of epigastric pain that often woke her up at night, has had EGDs with Rocky Mercado and Mendoza and a negative EUS for malignancy. Also had equivocal result for H Pylori. \par No weight loss, dysphagia, but has GERD responsive to PPI. \par Reports occasional rectal bleeding due to a hemorrhoid. Never had colonoscopy. \par s/p unremarkable EGD, colonoscopy in june 2022\par 9/1/2022\par Now comes in w 2 weeks hx of left sided abdominal pain radiating to the back,. sharp in nature, associated with diarrhea, occasionally bloody. No weight loss or vomiting., No night symtpoms. \par Comes in today with worsening CAMILA, now started iron pills. \par \par A/P: Iron deficiency anemia\par IBS-D\par GERD\par Hemorrhoids: anusol HC suppo daily at [facility]\par Stool softeners\par  CT scan A/P with contrast showed lymphadenopathy and PET scan at [facility] was reassuring, now following with Dr Gupta \par Rec: \par bentyl tid\par Continue PPI\par Capsule endoscopy \par Risks and benefits discussed with patient.\par ? hemorrhoidectomy for refractory CAMILA, advised IV iron infusions

## 2023-01-26 NOTE — HISTORY OF PRESENT ILLNESS
[Home] : at home, [unfilled] , at the time of the visit. [Medical Office: ([facility])___] : at the medical office located in  [Verbal consent obtained from patient] : the patient, [unfilled] [FreeTextEntry4] : Krupa Herrera  [de-identified] : 46 year old female patient average risk for CRC, presents with few months history of epigastric pain that often woke her up at night, has had EGDs with Rocky Mercado and Mendoza and a negative EUS for malignancy. Also had equivocal result for H Pylori. \par No weight loss, dysphagia, but has GERD responsive to PPI. \par Reports occasional rectal bleeding due to a hemorrhoid. Never had colonoscopy. \par s/p unremarkable EGD, colonoscopy in june 2022\par 9/1/2022\par Now comes in w 2 weeks hx of left sided abdominal pain radiating to the back,. sharp in nature, associated with diarrhea, occasionally bloody. \par Comes in today with worsening CAMILA, now started iron pills. \par

## 2023-02-15 NOTE — ASU DISCHARGE PLAN (ADULT/PEDIATRIC). - ITEMS TO FOLLOWUP WITH YOUR PHYSICIAN'S
Check FLP on Crestor 10mg daily   -refill Crestor today   follow up on biopsy results, follow up with Dr Donaldson in the office

## 2023-03-13 ENCOUNTER — APPOINTMENT (OUTPATIENT)
Dept: RHEUMATOLOGY | Facility: CLINIC | Age: 48
End: 2023-03-13
Payer: MEDICAID

## 2023-03-13 VITALS
HEIGHT: 65 IN | HEART RATE: 48 BPM | BODY MASS INDEX: 21.99 KG/M2 | SYSTOLIC BLOOD PRESSURE: 122 MMHG | OXYGEN SATURATION: 98 % | WEIGHT: 132 LBS | DIASTOLIC BLOOD PRESSURE: 80 MMHG

## 2023-03-13 PROCEDURE — 99204 OFFICE O/P NEW MOD 45 MIN: CPT

## 2023-03-13 NOTE — ASSESSMENT
[FreeTextEntry1] : Leg pain, fatigue, Sicca symptoms, paresthesias, lymphadenopathy, dyspnea, cough\par -Patient with various symptoms difficulty to say if she has an autoimmune disease but with sicca symptoms and salivary gland enlargement will rule out Sjogrens syndrome. She has fatigue that could also be from iron deficiency but will rule out connective tissue disease with serologies. No muscle weakness to suggest myositis. Doubt RA as no joint pains

## 2023-03-13 NOTE — HISTORY OF PRESENT ILLNESS
[FreeTextEntry1] : She has a history of salivary gland swelling 1-2 years ago, and then recently it recurred that resolved with antibiotics. She had lymph adenopathy s/p needle core biopsy that was benign. \par \par In the last 8-9 months she reports leg pains, dry eyes uses eye drops, dry mouth and uses mouth wash, blurry vision, numbness in toes and hands, fatigue, rash on the top of her back for the past 2-3 years s/p biopsy that was reportedly benign. She had hair loss around September-October but it stopped. She reports pain in her calves and legs that feel like a theodora horse that she needs to get up and walk around. She feels cramping randomly during the day but mostly at night.  Denies joint swelling or stiff joints.  She is unable to go the gym anymore due to fatigue and muscle weakness +Dyspnea and dry cough. +toes turn purple. +GI symptoms including ulcers following with GI s/p EGD and colonoscopy in June diagnosed with irritable bowel syndrome. Scheduled for pill endoscopy. \par \par Denies fevers, chills, night sweats, weight loss, photosensitivity, mucositis, dysphagia, skin thickening or tightening.\par \par She has a history of iron deficiency anemia and immune deficiency receiving IVIG treatment with hematology.\par \par +Miscarriage x 1 at 3 months

## 2023-03-14 ENCOUNTER — LABORATORY RESULT (OUTPATIENT)
Age: 48
End: 2023-03-14

## 2023-03-15 LAB
25(OH)D3 SERPL-MCNC: 35 NG/ML
CK SERPL-CCNC: 48 U/L
CRP SERPL-MCNC: 21 MG/L
ERYTHROCYTE [SEDIMENTATION RATE] IN BLOOD BY WESTERGREN METHOD: 22 MM/HR

## 2023-03-17 LAB
ANA SER IF-ACNC: NEGATIVE
C3 SERPL-MCNC: 109 MG/DL
C4 SERPL-MCNC: 15 MG/DL
CCP AB SER IA-ACNC: <8 UNITS
DSDNA AB SER-ACNC: <12 IU/ML
ENA JO1 AB SER IA-ACNC: <0.2 AL
ENA RNP AB SER IA-ACNC: 0.3 AL
ENA SCL70 IGG SER IA-ACNC: <0.2 AL
ENA SM AB SER IA-ACNC: <0.2 AL
ENA SS-A AB SER IA-ACNC: 0.2 AL
ENA SS-B AB SER IA-ACNC: <0.2 AL
HBV CORE IGG+IGM SER QL: NONREACTIVE
HBV CORE IGM SER QL: NONREACTIVE
HBV SURFACE AB SER QL: REACTIVE
HBV SURFACE AG SER QL: NONREACTIVE
HCV AB SER QL: NONREACTIVE
HCV S/CO RATIO: 0.17 S/CO
M TB IFN-G BLD-IMP: NEGATIVE
QUANTIFERON TB PLUS MITOGEN MINUS NIL: 3.52 IU/ML
QUANTIFERON TB PLUS NIL: 0.05 IU/ML
QUANTIFERON TB PLUS TB1 MINUS NIL: 0 IU/ML
QUANTIFERON TB PLUS TB2 MINUS NIL: -0.01 IU/ML
RF+CCP IGG SER-IMP: NEGATIVE
RIBOSOMAL P AB SER IA-ACNC: <0.2 AL
THYROGLOB AB SERPL-ACNC: <20 IU/ML
THYROPEROXIDASE AB SERPL IA-ACNC: 18.3 IU/ML
VIT B6 SERPL-MCNC: 2 UG/L

## 2023-03-21 LAB
B19V IGG SER QL IA: 4.71 INDEX
B19V IGG+IGM SER-IMP: NORMAL
B19V IGG+IGM SER-IMP: POSITIVE
B19V IGM FLD-ACNC: 0.16 INDEX
B19V IGM SER-ACNC: NEGATIVE

## 2023-03-22 NOTE — ED ADULT NURSE NOTE - CAS EDP DISCH TYPE
A user error has taken place: medication ordered in error, not dispensed to this patient.    
Patient called wanting an antibiotic for a UTI. I asked her if she can come down for a urine dip and culture so we can make sure she has a UTI and that we don't want to give her the wrong antibiotic. Patient stated she will be by the office to do this today.  
Home

## 2023-03-24 ENCOUNTER — NON-APPOINTMENT (OUTPATIENT)
Age: 48
End: 2023-03-24

## 2023-03-24 DIAGNOSIS — M35.00 SICCA SYNDROME, UNSPECIFIED: ICD-10-CM

## 2023-04-03 ENCOUNTER — APPOINTMENT (OUTPATIENT)
Dept: RHEUMATOLOGY | Facility: CLINIC | Age: 48
End: 2023-04-03

## 2023-04-10 LAB
EJ AB SER QL: NEGATIVE
ENA JO1 AB SER IA-ACNC: <20 UNITS
ENA PM/SCL AB SER-ACNC: <20 UNITS
ENA SM+RNP AB SER IA-ACNC: <20 UNITS
ENA SS-A IGG SER QL: <20 UNITS
FIBRILLARIN AB SER QL: NEGATIVE
KU AB SER QL: NEGATIVE
MDA-5 (P140)(CADM-140): <20 UNITS
MI2 AB SER QL: NEGATIVE
NXP-2 (P140): <20 UNITS
OJ AB SER QL: NEGATIVE
PL12 AB SER QL: NEGATIVE
PL7 AB SER QL: NEGATIVE
SRP AB SERPL QL: NEGATIVE
TIF GAMMA (P155/140): <20 UNITS
U2 SNRNP AB SER QL: NEGATIVE

## 2023-04-14 ENCOUNTER — NON-APPOINTMENT (OUTPATIENT)
Age: 48
End: 2023-04-14

## 2023-06-05 ENCOUNTER — APPOINTMENT (OUTPATIENT)
Dept: RHEUMATOLOGY | Facility: CLINIC | Age: 48
End: 2023-06-05

## 2023-06-26 ENCOUNTER — APPOINTMENT (OUTPATIENT)
Dept: RHEUMATOLOGY | Facility: CLINIC | Age: 48
End: 2023-06-26

## 2023-09-19 NOTE — H&P PST ADULT - PRO TOBACCO TYPE
Tetracycline Counseling: Patient counseled regarding possible photosensitivity and increased risk for sunburn.  Patient instructed to avoid sunlight, if possible.  When exposed to sunlight, patients should wear protective clothing, sunglasses, and sunscreen.  The patient was instructed to call the office immediately if the following severe adverse effects occur:  hearing changes, easy bruising/bleeding, severe headache, or vision changes.  The patient verbalized understanding of the proper use and possible adverse effects of tetracycline.  All of the patient's questions and concerns were addressed. Patient understands to avoid pregnancy while on therapy due to potential birth defects. Spironolactone Counseling: Patient advised regarding risks of diarrhea, abdominal pain, hyperkalemia, birth defects (for female patients), liver toxicity and renal toxicity. The patient may need blood work to monitor liver and kidney function and potassium levels while on therapy. The patient verbalized understanding of the proper use and possible adverse effects of spironolactone.  All of the patient's questions and concerns were addressed. Sarecycline Counseling: Patient advised regarding possible photosensitivity and discoloration of the teeth, skin, lips, tongue and gums.  Patient instructed to avoid sunlight, if possible.  When exposed to sunlight, patients should wear protective clothing, sunglasses, and sunscreen.  The patient was instructed to call the office immediately if the following severe adverse effects occur:  hearing changes, easy bruising/bleeding, severe headache, or vision changes.  The patient verbalized understanding of the proper use and possible adverse effects of sarecycline.  All of the patient's questions and concerns were addressed. Benzoyl Peroxide Counseling: Patient counseled that medicine may cause skin irritation and bleach clothing.  In the event of skin irritation, the patient was advised to reduce the amount of the drug applied or use it less frequently.   The patient verbalized understanding of the proper use and possible adverse effects of benzoyl peroxide.  All of the patient's questions and concerns were addressed. Birth Control Pills Pregnancy And Lactation Text: This medication should be avoided if pregnant and for the first 30 days post-partum. Azelaic Acid Counseling: Patient counseled that medicine may cause skin irritation and to avoid applying near the eyes.  In the event of skin irritation, the patient was advised to reduce the amount of the drug applied or use it less frequently.   The patient verbalized understanding of the proper use and possible adverse effects of azelaic acid.  All of the patient's questions and concerns were addressed. Azithromycin Pregnancy And Lactation Text: This medication is considered safe during pregnancy and is also secreted in breast milk. Bactrim Pregnancy And Lactation Text: This medication is Pregnancy Category D and is known to cause fetal risk.  It is also excreted in breast milk. Aklief counseling:  Patient advised to apply a pea-sized amount only at bedtime and wait 30 minutes after washing their face before applying.  If too drying, patient may add a non-comedogenic moisturizer.  The most commonly reported side effects including irritation, redness, scaling, dryness, stinging, burning, itching, and increased risk of sunburn.  The patient verbalized understanding of the proper use and possible adverse effects of retinoids.  All of the patient's questions and concerns were addressed. Erythromycin Counseling:  I discussed with the patient the risks of erythromycin including but not limited to GI upset, allergic reaction, drug rash, diarrhea, increase in liver enzymes, and yeast infections. Isotretinoin Counseling: Patient should get monthly blood tests, not donate blood, not drive at night if vision affected, not share medication, and not undergo elective surgery for 6 months after tx completed. Side effects reviewed, pt to contact office should one occur. Tazorac Pregnancy And Lactation Text: This medication is not safe during pregnancy. It is unknown if this medication is excreted in breast milk. Topical Retinoid Pregnancy And Lactation Text: This medication is Pregnancy Category C. It is unknown if this medication is excreted in breast milk. Doxycycline Counseling:  Patient counseled regarding possible photosensitivity and increased risk for sunburn.  Patient instructed to avoid sunlight, if possible.  When exposed to sunlight, patients should wear protective clothing, sunglasses, and sunscreen.  The patient was instructed to call the office immediately if the following severe adverse effects occur:  hearing changes, easy bruising/bleeding, severe headache, or vision changes.  The patient verbalized understanding of the proper use and possible adverse effects of doxycycline.  All of the patient's questions and concerns were addressed. Benzoyl Peroxide Pregnancy And Lactation Text: This medication is Pregnancy Category C. It is unknown if benzoyl peroxide is excreted in breast milk. Minocycline Counseling: Patient advised regarding possible photosensitivity and discoloration of the teeth, skin, lips, tongue and gums.  Patient instructed to avoid sunlight, if possible.  When exposed to sunlight, patients should wear protective clothing, sunglasses, and sunscreen.  The patient was instructed to call the office immediately if the following severe adverse effects occur:  hearing changes, easy bruising/bleeding, severe headache, or vision changes.  The patient verbalized understanding of the proper use and possible adverse effects of minocycline.  All of the patient's questions and concerns were addressed. Winlevi Pregnancy And Lactation Text: This medication is considered safe during pregnancy and breastfeeding. Topical Sulfur Applications Pregnancy And Lactation Text: This medication is Pregnancy Category C and has an unknown safety profile during pregnancy. It is unknown if this topical medication is excreted in breast milk. High Dose Vitamin A Counseling: Side effects reviewed, pt to contact office should one occur. Topical Clindamycin Pregnancy And Lactation Text: This medication is Pregnancy Category B and is considered safe during pregnancy. It is unknown if it is excreted in breast milk. Detail Level: Zone Tetracycline Pregnancy And Lactation Text: This medication is Pregnancy Category D and not consider safe during pregnancy. It is also excreted in breast milk. Spironolactone Pregnancy And Lactation Text: This medication can cause feminization of the male fetus and should be avoided during pregnancy. The active metabolite is also found in breast milk. Birth Control Pills Counseling: Birth Control Pill Counseling: I discussed with the patient the potential side effects of OCPs including but not limited to increased risk of stroke, heart attack, thrombophlebitis, deep venous thrombosis, hepatic adenomas, breast changes, GI upset, headaches, and depression.  The patient verbalized understanding of the proper use and possible adverse effects of OCPs. All of the patient's questions and concerns were addressed. Dapsone Counseling: I discussed with the patient the risks of dapsone including but not limited to hemolytic anemia, agranulocytosis, rashes, methemoglobinemia, kidney failure, peripheral neuropathy, headaches, GI upset, and liver toxicity.  Patients who start dapsone require monitoring including baseline LFTs and weekly CBCs for the first month, then every month thereafter.  The patient verbalized understanding of the proper use and possible adverse effects of dapsone.  All of the patient's questions and concerns were addressed. Azelaic Acid Pregnancy And Lactation Text: This medication is considered safe during pregnancy and breast feeding. Use Enhanced Medication Counseling?: No Aklief Pregnancy And Lactation Text: It is unknown if this medication is safe to use during pregnancy.  It is unknown if this medication is excreted in breast milk.  Breastfeeding women should use the topical cream on the smallest area of the skin for the shortest time needed while breastfeeding.  Do not apply to nipple and areola. Bactrim Counseling:  I discussed with the patient the risks of sulfa antibiotics including but not limited to GI upset, allergic reaction, drug rash, diarrhea, dizziness, photosensitivity, and yeast infections.  Rarely, more serious reactions can occur including but not limited to aplastic anemia, agranulocytosis, methemoglobinemia, blood dyscrasias, liver or kidney failure, lung infiltrates or desquamative/blistering drug rashes. Azithromycin Counseling:  I discussed with the patient the risks of azithromycin including but not limited to GI upset, allergic reaction, drug rash, diarrhea, and yeast infections. Erythromycin Pregnancy And Lactation Text: This medication is Pregnancy Category B and is considered safe during pregnancy. It is also excreted in breast milk. Tazorac Counseling:  Patient advised that medication is irritating and drying.  Patient may need to apply sparingly and wash off after an hour before eventually leaving it on overnight.  The patient verbalized understanding of the proper use and possible adverse effects of tazorac.  All of the patient's questions and concerns were addressed. Dapsone Pregnancy And Lactation Text: This medication is Pregnancy Category C and is not considered safe during pregnancy or breast feeding. Doxycycline Pregnancy And Lactation Text: This medication is Pregnancy Category D and not consider safe during pregnancy. It is also excreted in breast milk but is considered safe for shorter treatment courses. Topical Retinoid counseling:  Patient advised to apply a pea-sized amount only at bedtime and wait 30 minutes after washing their face before applying.  If too drying, patient may add a non-comedogenic moisturizer. The patient verbalized understanding of the proper use and possible adverse effects of retinoids.  All of the patient's questions and concerns were addressed. High Dose Vitamin A Pregnancy And Lactation Text: High dose vitamin A therapy is contraindicated during pregnancy and breast feeding. Winlevi Counseling:  I discussed with the patient the risks of topical clascoterone including but not limited to erythema, scaling, itching, and stinging. Patient voiced their understanding. Isotretinoin Pregnancy And Lactation Text: This medication is Pregnancy Category X and is considered extremely dangerous during pregnancy. It is unknown if it is excreted in breast milk. Topical Sulfur Applications Counseling: Topical Sulfur Counseling: Patient counseled that this medication may cause skin irritation or allergic reactions.  In the event of skin irritation, the patient was advised to reduce the amount of the drug applied or use it less frequently.   The patient verbalized understanding of the proper use and possible adverse effects of topical sulfur application.  All of the patient's questions and concerns were addressed. Topical Clindamycin Counseling: Patient counseled that this medication may cause skin irritation or allergic reactions.  In the event of skin irritation, the patient was advised to reduce the amount of the drug applied or use it less frequently.   The patient verbalized understanding of the proper use and possible adverse effects of clindamycin.  All of the patient's questions and concerns were addressed. QUIT 20 YRS/cigarettes

## 2024-06-25 ENCOUNTER — NON-APPOINTMENT (OUTPATIENT)
Age: 49
End: 2024-06-25

## 2024-11-19 ENCOUNTER — APPOINTMENT (OUTPATIENT)
Dept: GASTROENTEROLOGY | Facility: CLINIC | Age: 49
End: 2024-11-19
Payer: MEDICAID

## 2024-11-19 VITALS — BODY MASS INDEX: 22.99 KG/M2 | WEIGHT: 138 LBS | HEIGHT: 65 IN

## 2024-11-19 DIAGNOSIS — K62.89 OTHER SPECIFIED DISEASES OF ANUS AND RECTUM: ICD-10-CM

## 2024-11-19 DIAGNOSIS — K80.50 CALCULUS OF BILE DUCT W/OUT CHOLANGITIS OR CHOLECYSTITIS W/OUT OBSTRUCTION: ICD-10-CM

## 2024-11-19 DIAGNOSIS — K58.9 IRRITABLE BOWEL SYNDROME, UNSPECIFIED: ICD-10-CM

## 2024-11-19 DIAGNOSIS — R10.9 UNSPECIFIED ABDOMINAL PAIN: ICD-10-CM

## 2024-11-19 DIAGNOSIS — D50.9 IRON DEFICIENCY ANEMIA, UNSPECIFIED: ICD-10-CM

## 2024-11-19 DIAGNOSIS — K64.4 RESIDUAL HEMORRHOIDAL SKIN TAGS: ICD-10-CM

## 2024-11-19 DIAGNOSIS — K64.9 UNSPECIFIED HEMORRHOIDS: ICD-10-CM

## 2024-11-19 PROCEDURE — 99214 OFFICE O/P EST MOD 30 MIN: CPT

## 2024-11-19 RX ORDER — HYDROCORTISONE ACETATE 25 MG/1
25 SUPPOSITORY RECTAL
Qty: 1 | Refills: 6 | Status: ACTIVE | COMMUNITY
Start: 2024-11-19 | End: 1900-01-01

## 2024-11-19 RX ORDER — ALPRAZOLAM 2 MG/1
TABLET ORAL
Refills: 0 | Status: ACTIVE | COMMUNITY

## 2024-11-25 ENCOUNTER — NON-APPOINTMENT (OUTPATIENT)
Age: 49
End: 2024-11-25

## 2024-11-25 NOTE — REASON FOR VISIT
11/25/24                            Benjamin Das  [address] [address]    To Whom It May Concern:    This is to certify Benjamin Das was evaluated with Chantelle Tsai NP on 11/25/24 and can return to school on 12/2.    Please excuse from missed day of school due to illness.      RESTRICTIONS: None             Electronically signed by:  Chantelle Tsai NP  [facility]  [address] [address]  Dept Phone: [phone]        [FreeTextEntry1] : Hx of ulcers/ CRC screening

## 2024-12-06 ENCOUNTER — APPOINTMENT (OUTPATIENT)
Dept: SURGERY | Facility: CLINIC | Age: 49
End: 2024-12-06

## 2025-01-09 ENCOUNTER — NON-APPOINTMENT (OUTPATIENT)
Age: 50
End: 2025-01-09

## 2025-01-25 ENCOUNTER — NON-APPOINTMENT (OUTPATIENT)
Age: 50
End: 2025-01-25

## 2025-02-17 ENCOUNTER — INPATIENT (INPATIENT)
Facility: HOSPITAL | Age: 50
LOS: 0 days | Discharge: HOME | DRG: 241 | End: 2025-02-18
Attending: STUDENT IN AN ORGANIZED HEALTH CARE EDUCATION/TRAINING PROGRAM | Admitting: STUDENT IN AN ORGANIZED HEALTH CARE EDUCATION/TRAINING PROGRAM
Payer: MEDICAID

## 2025-02-17 VITALS
SYSTOLIC BLOOD PRESSURE: 127 MMHG | WEIGHT: 130.07 LBS | TEMPERATURE: 98 F | DIASTOLIC BLOOD PRESSURE: 89 MMHG | HEART RATE: 122 BPM | OXYGEN SATURATION: 99 % | RESPIRATION RATE: 18 BRPM

## 2025-02-17 DIAGNOSIS — K92.2 GASTROINTESTINAL HEMORRHAGE, UNSPECIFIED: ICD-10-CM

## 2025-02-17 LAB
ABO RH CONFIRMATION: SIGNIFICANT CHANGE UP
ALBUMIN SERPL ELPH-MCNC: 3.9 G/DL — SIGNIFICANT CHANGE UP (ref 3.5–5.2)
ALP SERPL-CCNC: 41 U/L — SIGNIFICANT CHANGE UP (ref 30–115)
ALT FLD-CCNC: 18 U/L — SIGNIFICANT CHANGE UP (ref 0–41)
ANION GAP SERPL CALC-SCNC: 12 MMOL/L — SIGNIFICANT CHANGE UP (ref 7–14)
AST SERPL-CCNC: 19 U/L — SIGNIFICANT CHANGE UP (ref 0–41)
BASOPHILS # BLD AUTO: 0.08 K/UL — SIGNIFICANT CHANGE UP (ref 0–0.2)
BASOPHILS # BLD AUTO: 0.1 K/UL — SIGNIFICANT CHANGE UP (ref 0–0.2)
BASOPHILS NFR BLD AUTO: 0.7 % — SIGNIFICANT CHANGE UP (ref 0–1)
BASOPHILS NFR BLD AUTO: 0.7 % — SIGNIFICANT CHANGE UP (ref 0–1)
BILIRUB SERPL-MCNC: <0.2 MG/DL — SIGNIFICANT CHANGE UP (ref 0.2–1.2)
BLD GP AB SCN SERPL QL: SIGNIFICANT CHANGE UP
BUN SERPL-MCNC: 26 MG/DL — HIGH (ref 10–20)
CALCIUM SERPL-MCNC: 8.5 MG/DL — SIGNIFICANT CHANGE UP (ref 8.4–10.5)
CHLORIDE SERPL-SCNC: 101 MMOL/L — SIGNIFICANT CHANGE UP (ref 98–110)
CO2 SERPL-SCNC: 23 MMOL/L — SIGNIFICANT CHANGE UP (ref 17–32)
CREAT SERPL-MCNC: 0.8 MG/DL — SIGNIFICANT CHANGE UP (ref 0.7–1.5)
EGFR: 89 ML/MIN/1.73M2 — SIGNIFICANT CHANGE UP
EOSINOPHIL # BLD AUTO: 0.03 K/UL — SIGNIFICANT CHANGE UP (ref 0–0.7)
EOSINOPHIL # BLD AUTO: 0.06 K/UL — SIGNIFICANT CHANGE UP (ref 0–0.7)
EOSINOPHIL NFR BLD AUTO: 0.3 % — SIGNIFICANT CHANGE UP (ref 0–8)
EOSINOPHIL NFR BLD AUTO: 0.4 % — SIGNIFICANT CHANGE UP (ref 0–8)
GLUCOSE SERPL-MCNC: 112 MG/DL — HIGH (ref 70–99)
HCT VFR BLD CALC: 21.6 % — LOW (ref 37–47)
HCT VFR BLD CALC: 22.1 % — LOW (ref 37–47)
HCT VFR BLD CALC: 24.3 % — LOW (ref 37–47)
HGB BLD-MCNC: 7.1 G/DL — LOW (ref 12–16)
HGB BLD-MCNC: 7.3 G/DL — LOW (ref 12–16)
HGB BLD-MCNC: 7.9 G/DL — LOW (ref 12–16)
IMM GRANULOCYTES NFR BLD AUTO: 0.4 % — HIGH (ref 0.1–0.3)
IMM GRANULOCYTES NFR BLD AUTO: 0.4 % — HIGH (ref 0.1–0.3)
LIDOCAIN IGE QN: 66 U/L — HIGH (ref 7–60)
LYMPHOCYTES # BLD AUTO: 23 % — SIGNIFICANT CHANGE UP (ref 20.5–51.1)
LYMPHOCYTES # BLD AUTO: 28.3 % — SIGNIFICANT CHANGE UP (ref 20.5–51.1)
LYMPHOCYTES # BLD AUTO: 3.18 K/UL — SIGNIFICANT CHANGE UP (ref 1.2–3.4)
LYMPHOCYTES # BLD AUTO: 3.22 K/UL — SIGNIFICANT CHANGE UP (ref 1.2–3.4)
MCHC RBC-ENTMCNC: 27.1 PG — SIGNIFICANT CHANGE UP (ref 27–31)
MCHC RBC-ENTMCNC: 27.5 PG — SIGNIFICANT CHANGE UP (ref 27–31)
MCHC RBC-ENTMCNC: 27.5 PG — SIGNIFICANT CHANGE UP (ref 27–31)
MCHC RBC-ENTMCNC: 32.5 G/DL — SIGNIFICANT CHANGE UP (ref 32–37)
MCHC RBC-ENTMCNC: 32.9 G/DL — SIGNIFICANT CHANGE UP (ref 32–37)
MCHC RBC-ENTMCNC: 33 G/DL — SIGNIFICANT CHANGE UP (ref 32–37)
MCV RBC AUTO: 83.2 FL — SIGNIFICANT CHANGE UP (ref 81–99)
MCV RBC AUTO: 83.4 FL — SIGNIFICANT CHANGE UP (ref 81–99)
MCV RBC AUTO: 83.7 FL — SIGNIFICANT CHANGE UP (ref 81–99)
MONOCYTES # BLD AUTO: 0.34 K/UL — SIGNIFICANT CHANGE UP (ref 0.1–0.6)
MONOCYTES # BLD AUTO: 0.5 K/UL — SIGNIFICANT CHANGE UP (ref 0.1–0.6)
MONOCYTES NFR BLD AUTO: 3 % — SIGNIFICANT CHANGE UP (ref 1.7–9.3)
MONOCYTES NFR BLD AUTO: 3.6 % — SIGNIFICANT CHANGE UP (ref 1.7–9.3)
NEUTROPHILS # BLD AUTO: 7.66 K/UL — HIGH (ref 1.4–6.5)
NEUTROPHILS # BLD AUTO: 9.96 K/UL — HIGH (ref 1.4–6.5)
NEUTROPHILS NFR BLD AUTO: 67.3 % — SIGNIFICANT CHANGE UP (ref 42.2–75.2)
NEUTROPHILS NFR BLD AUTO: 71.9 % — SIGNIFICANT CHANGE UP (ref 42.2–75.2)
NRBC BLD AUTO-RTO: 0 /100 WBCS — SIGNIFICANT CHANGE UP (ref 0–0)
NT-PROBNP SERPL-SCNC: 36 PG/ML — SIGNIFICANT CHANGE UP (ref 0–300)
PLATELET # BLD AUTO: 334 K/UL — SIGNIFICANT CHANGE UP (ref 130–400)
PLATELET # BLD AUTO: 346 K/UL — SIGNIFICANT CHANGE UP (ref 130–400)
PLATELET # BLD AUTO: 404 K/UL — HIGH (ref 130–400)
PMV BLD: 9.4 FL — SIGNIFICANT CHANGE UP (ref 7.4–10.4)
PMV BLD: 9.5 FL — SIGNIFICANT CHANGE UP (ref 7.4–10.4)
PMV BLD: 9.6 FL — SIGNIFICANT CHANGE UP (ref 7.4–10.4)
POTASSIUM SERPL-MCNC: 4.8 MMOL/L — SIGNIFICANT CHANGE UP (ref 3.5–5)
POTASSIUM SERPL-SCNC: 4.8 MMOL/L — SIGNIFICANT CHANGE UP (ref 3.5–5)
PROT SERPL-MCNC: 5.4 G/DL — LOW (ref 6–8)
RBC # BLD: 2.58 M/UL — LOW (ref 4.2–5.4)
RBC # BLD: 2.65 M/UL — LOW (ref 4.2–5.4)
RBC # BLD: 2.92 M/UL — LOW (ref 4.2–5.4)
RBC # FLD: 16.2 % — HIGH (ref 11.5–14.5)
SODIUM SERPL-SCNC: 136 MMOL/L — SIGNIFICANT CHANGE UP (ref 135–146)
WBC # BLD: 10.41 K/UL — SIGNIFICANT CHANGE UP (ref 4.8–10.8)
WBC # BLD: 11.37 K/UL — HIGH (ref 4.8–10.8)
WBC # BLD: 13.85 K/UL — HIGH (ref 4.8–10.8)
WBC # FLD AUTO: 10.41 K/UL — SIGNIFICANT CHANGE UP (ref 4.8–10.8)
WBC # FLD AUTO: 11.37 K/UL — HIGH (ref 4.8–10.8)
WBC # FLD AUTO: 13.85 K/UL — HIGH (ref 4.8–10.8)

## 2025-02-17 PROCEDURE — 83550 IRON BINDING TEST: CPT

## 2025-02-17 PROCEDURE — 85025 COMPLETE CBC W/AUTO DIFF WBC: CPT

## 2025-02-17 PROCEDURE — 85027 COMPLETE CBC AUTOMATED: CPT

## 2025-02-17 PROCEDURE — 88305 TISSUE EXAM BY PATHOLOGIST: CPT

## 2025-02-17 PROCEDURE — 80048 BASIC METABOLIC PNL TOTAL CA: CPT

## 2025-02-17 PROCEDURE — 71045 X-RAY EXAM CHEST 1 VIEW: CPT | Mod: 26

## 2025-02-17 PROCEDURE — 83540 ASSAY OF IRON: CPT

## 2025-02-17 PROCEDURE — 99285 EMERGENCY DEPT VISIT HI MDM: CPT

## 2025-02-17 PROCEDURE — P9016: CPT

## 2025-02-17 PROCEDURE — 82746 ASSAY OF FOLIC ACID SERUM: CPT

## 2025-02-17 PROCEDURE — 81025 URINE PREGNANCY TEST: CPT

## 2025-02-17 PROCEDURE — 36430 TRANSFUSION BLD/BLD COMPNT: CPT

## 2025-02-17 PROCEDURE — 84466 ASSAY OF TRANSFERRIN: CPT

## 2025-02-17 PROCEDURE — 99222 1ST HOSP IP/OBS MODERATE 55: CPT

## 2025-02-17 PROCEDURE — 93010 ELECTROCARDIOGRAM REPORT: CPT

## 2025-02-17 PROCEDURE — 84443 ASSAY THYROID STIM HORMONE: CPT

## 2025-02-17 PROCEDURE — 99253 IP/OBS CNSLTJ NEW/EST LOW 45: CPT

## 2025-02-17 PROCEDURE — 82607 VITAMIN B-12: CPT

## 2025-02-17 PROCEDURE — 88312 SPECIAL STAINS GROUP 1: CPT

## 2025-02-17 PROCEDURE — 36415 COLL VENOUS BLD VENIPUNCTURE: CPT

## 2025-02-17 PROCEDURE — 82728 ASSAY OF FERRITIN: CPT

## 2025-02-17 RX ORDER — ALPRAZOLAM 0.5 MG
2 TABLET, EXTENDED RELEASE 24 HR ORAL AT BEDTIME
Refills: 0 | Status: DISCONTINUED | OUTPATIENT
Start: 2025-02-17 | End: 2025-02-18

## 2025-02-17 RX ORDER — LEVOTHYROXINE SODIUM 300 MCG
50 TABLET ORAL DAILY
Refills: 0 | Status: DISCONTINUED | OUTPATIENT
Start: 2025-02-17 | End: 2025-02-18

## 2025-02-17 RX ORDER — SODIUM CHLORIDE 9 G/1000ML
1000 INJECTION, SOLUTION INTRAVENOUS ONCE
Refills: 0 | Status: COMPLETED | OUTPATIENT
Start: 2025-02-17 | End: 2025-02-17

## 2025-02-17 RX ORDER — MELATONIN 5 MG
3 TABLET ORAL AT BEDTIME
Refills: 0 | Status: DISCONTINUED | OUTPATIENT
Start: 2025-02-17 | End: 2025-02-18

## 2025-02-17 RX ORDER — ACETAMINOPHEN 500 MG/5ML
650 LIQUID (ML) ORAL EVERY 6 HOURS
Refills: 0 | Status: DISCONTINUED | OUTPATIENT
Start: 2025-02-17 | End: 2025-02-18

## 2025-02-17 RX ADMIN — Medication 10 MG/HR: at 16:19

## 2025-02-17 RX ADMIN — Medication 2 MILLIGRAM(S): at 21:48

## 2025-02-17 RX ADMIN — SODIUM CHLORIDE 1000 MILLILITER(S): 9 INJECTION, SOLUTION INTRAVENOUS at 16:23

## 2025-02-17 RX ADMIN — Medication 80 MILLIGRAM(S): at 16:14

## 2025-02-17 RX ADMIN — SODIUM CHLORIDE 1000 MILLILITER(S): 9 INJECTION, SOLUTION INTRAVENOUS at 16:10

## 2025-02-17 NOTE — H&P ADULT - NSHPPHYSICALEXAM_GEN_ALL_CORE
T(C): 36.7 (02-17-25 @ 16:31), Max: 36.7 (02-17-25 @ 15:10)  HR: 103 (02-17-25 @ 16:31) (103 - 122)  BP: 138/93 (02-17-25 @ 16:31) (127/89 - 138/93)  RR: 18 (02-17-25 @ 16:31) (18 - 18)  SpO2: 99% (02-17-25 @ 16:31) (99% - 99%)    PHYSICAL EXAM:  GENERAL: laying in bed, appearing comfortable and in NAD  HEENT: Moist mucous membranes  NECK: Supple  CHEST/LUNG: even respirations b/l; no accessory muscle use  ABDOMEN: Soft, Nontender, Nondistended  EXTREMITIES:   No calf TTP b/l  SKIN: warm  Neuro: A&Ox4

## 2025-02-17 NOTE — PATIENT PROFILE ADULT - FALL HARM RISK - HARM RISK INTERVENTIONS
Assistance with ambulation/Assistance OOB with selected safe patient handling equipment/Communicate Risk of Fall with Harm to all staff/Monitor gait and stability/Reinforce activity limits and safety measures with patient and family/Sit up slowly, dangle for a short time, stand at bedside before walking/Tailored Fall Risk Interventions/Visual Cue: Yellow wristband and red socks/Bed in lowest position, wheels locked, appropriate side rails in place/Call bell, personal items and telephone in reach/Instruct patient to call for assistance before getting out of bed or chair/Non-slip footwear when patient is out of bed/[place] to call system/Physically safe environment - no spills, clutter or unnecessary equipment/Purposeful Proactive Rounding/Room/bathroom lighting operational, light cord in reach

## 2025-02-17 NOTE — H&P ADULT - ASSESSMENT
50 y/o F PMHx hypothyroidism, h/o hemorrhoids on OCPs, c/o 3 day h/o diarrhea, dark,tarry stools, lack of appetite, "stomach soreness," and nausea. Pt reports 1  episode of dark, "blackish" like stool last Monday, which seem to have resolved until sxs restarted 3 days ago. Pr reports Gi doctor is Dr. Cedillo, w/ last colonoscopy, ~3yrs ago w/ Dr. Chavarria, demonstrating hemorrhoids ( no polyps)       Pt denies acute vision changes, visible signs of bleeding, chest pain, SOB, use of iron supplements, vomiting, unable to tolerate PO intake, fevers,       Plan    Admit to tele     #dark starry stools & anemia r/o GI bleed  - Start clear liquid diet, NPO if pending procedures  - GI consult and management  - PPI (Protonix) IV BID  - Refrain use of Nsaids / ASA  - Maintain Hemodynamic Stability  - Active T&S    - f/u 10pm CBC; Trend H/H; Transfuse prn  - Two large bore IV lines   - Monitor Stool for Hematochezia Melena, frequency and consistency  - Obtain Iron Studies, Folate, Vitamin B12 levels   - Serial exams  - hold OCP until seen by GI and improvement of Hgb  - IVF  -     #Hypothyroidism  - c/w levothyroxine   - f/u TSH in AM      VTE: SCDs  pt d/w Dr. Manuel    pt d/w    50 y/o F PMHx hypothyroidism, h/o hemorrhoids on OCPs, c/o 3 day h/o diarrhea, dark,tarry stools, lack of appetite, "stomach soreness," and nausea. Pt reports 1  episode of dark, "blackish" like stool last Monday, which seem to have resolved until sxs restarted 3 days ago. Pr reports Gi doctor is Dr. Cedillo, w/ last colonoscopy, ~3yrs ago w/ Dr. Chavarria, demonstrating hemorrhoids ( no polyps)       Pt denies acute vision changes, visible signs of bleeding, chest pain, SOB, use of iron supplements, vomiting, unable to tolerate PO intake, fevers,       Plan    Admit to tele     #dark starry stools & anemia r/o GI bleed  Feb 2022: Hgb 12  Hgb 7.9 upon admission  - Start clear liquid diet  - GI consult and management  - PPI drip  - Refrain use of Nsaids / ASA  - Maintain Hemodynamic Stability  - Active T&S    - f/u 10pm CBC; Trend H/H; Transfuse prn  - Two large bore IV lines   - Monitor Stool for Hematochezia Melena, frequency and consistency  - Obtain Iron Studies, Folate, Vitamin B12 levels   - Serial exams  - hold OCP until seen by GI and improvement of Hgb  - IVF  -     #Hypothyroidism  - c/w levothyroxine   - f/u TSH in AM      VTE: SCDs  pt d/w Dr. Manuel     48 y/o F PMHx hypothyroidism, h/o hemorrhoids on OCPs, c/o 3 day h/o diarrhea, dark,tarry stools, lack of appetite, "stomach soreness," and nausea. Pt reports 1  episode of dark, "blackish" like stool last Monday, which seem to have resolved until sxs restarted 3 days ago. Pr reports Gi doctor is Dr. Cedillo, w/ last colonoscopy, ~3yrs ago w/ Dr. Chavarria, demonstrating hemorrhoids ( no polyps)       Pt denies acute vision changes, visible signs of bleeding, chest pain, SOB, use of iron supplements, vomiting, unable to tolerate PO intake, fevers,       Plan    Admit to tele     #dark starry stools & anemia r/o GI bleed  Feb 2022: Hgb 12  Hgb 7.9 upon admission  1 unit PRBC ordered  - f/u 10pm CBC  -Trend H/H; Transfuse prn  - Start clear liquid diet  - GI consult and management  - PPI drip  - Refrain use of Nsaids / ASA  - c/w OCP per med attending  - Maintain Hemodynamic Stability  - Active T&S    - f/u 10pm CBC; Trend H/H; Transfuse prn  - Two large bore IV lines   - Monitor Stool for Hematochezia Melena, frequency and consistency  - Obtain Iron Studies, Folate, Vitamin B12 levels   - Serial exams  - IVF      #Hypothyroidism  - c/w levothyroxine   - f/u TSH in AM      VTE: SCDs      pt d/w Dr. Manuel     48 y/o F PMHx hypothyroidism, h/o hemorrhoids on OCPs, c/o 3 day h/o diarrhea, dark,tarry stools, lack of appetite, "stomach soreness," and nausea. Pt reports 1  episode of dark, "blackish" like stool last Monday, which seem to have resolved until sxs restarted 3 days ago. Pr reports Gi doctor is Dr. Cedillo, w/ last colonoscopy, ~3yrs ago w/ Dr. Chavarria, demonstrating hemorrhoids ( no polyps)       Pt denies acute vision changes, visible signs of bleeding, chest pain, SOB, use of iron supplements, vomiting, unable to tolerate PO intake, fevers,       Plan    Admit to tele     #dark starry stools & anemia r/o GI bleed  Feb 2022: Hgb 12  Hgb 7.9 upon admission  1 unit PRBC ordered  - f/u 10pm CBC  -Trend H/H; Transfuse prn  - Start clear liquid diet  - GI consult and management  - PPI drip  - Refrain use of Nsaids / ASA  - c/w OCP per med attending  - Maintain Hemodynamic Stability  - Active T&S    - f/u 10pm CBC; Trend H/H; Transfuse prn  - Two large bore IV lines   - Monitor Stool for Hematochezia Melena, frequency and consistency  - Obtain Iron Studies, Folate, Vitamin B12 levels   - Serial exams  - IVF      #Hypothyroidism  - c/w levothyroxine   - f/u TSH in AM      VTE: SCDs    [place] non-formulary- pt will need to bring rx supply from  home  pt d/w Dr. Manuel     48 y/o F PMHx hypothyroidism, h/o hemorrhoids on OCPs, c/o 3 day h/o diarrhea, dark,tarry stools, lack of appetite, "stomach soreness," and nausea. Pt reports 1  episode of dark, "blackish" like stool last Monday, which seem to have resolved until sxs restarted 3 days ago. Pr reports Gi doctor is Dr. Cedillo, w/ last colonoscopy, ~3yrs ago w/ Dr. Chavarria, demonstrating hemorrhoids ( no polyps)       Pt denies acute vision changes, visible signs of bleeding, chest pain, SOB, use of iron supplements, vomiting, unable to tolerate PO intake, fevers,       Plan    Admit to tele     #dark starry stools & anemia r/o GI bleed  Feb 2022: Hgb 12  Hgb 7.9 upon admission  1 unit PRBC ordered  - f/u 10pm CBC  -Trend H/H; Transfuse prn  - Start clear liquid diet  - GI consult and management  - PPI drip  - Refrain use of Nsaids / ASA  - c/w OCP per med attending  - Maintain Hemodynamic Stability  - Active T&S    - f/u 10pm CBC; Trend H/H; Transfuse prn  - Two large bore IV lines   - Monitor Stool for Hematochezia Melena, frequency and consistency  - Obtain Iron Studies, Folate, Vitamin B12 levels   - Serial exams  - IVF      #Hypothyroidism  - c/w levothyroxine   - f/u TSH in AM      VTE: SCDs    [place] non-formulary- pt will need to bring rx supply from  home( pt aware and notes she will ask her children to bring rx for verification       pt d/w Dr. Manuel

## 2025-02-17 NOTE — ED PROVIDER NOTE - PHYSICAL EXAMINATION
Physical Exam    Vital Signs: I have reviewed the initial vital signs.  Constitutional: appears stated age, no acute distress  Eyes: Conjunctiva pallor, Sclera clear  Cardiovascular: S1 and S2, regular rate, regular rhythm, well-perfused extremities, radial pulses equal and 2+, pedal pulses 2+ and equal  Respiratory: unlabored respiratory effort, clear to auscultation bilaterally no wheezing, rales and rhonchi  Gastrointestinal: soft, non-tender abdomen, no pulsatile mass, normal bowl sounds  Musculoskeletal: supple neck, no lower extremity edema, no midline tenderness  Integumentary: warm, dry, no rash  Neurologic: awake, alert, nvi

## 2025-02-17 NOTE — CONSULT NOTE ADULT - SUBJECTIVE AND OBJECTIVE BOX
49 y.o F / hx of hypothyroidism, h/o hemorrhoids, hx of PUD from NSAIDs, being evaluated for melena and concern for UGIB.     pt c/o 3 day hx of diarrhea, w/ reported dark tarry stools, lack of appetite, epigastric pain and nausea.   Pt reports using NSAIDs for hemorrhoidal pain that started around 3 weeks ago.   she was taking ibuprofen 400 mg q6-8h daily and alternating with tylenol.   she is on daily pantoprazole for GERD which she was taking.     she has 1  episode of dark, "blackish" like stool last Monday, which seem to have resolved until symptoms restarted 3 days ago.   today, she had 2 episodes of black stools.     she is feeling weak but denies syncope or presyncope, has no acute vision changes, other sources of bleeding, chest pain, SOB, use of iron supplements, vomiting, unable to tolerate PO intake, fevers or chills.   ROS otherwise negative.       she is a pt of Dr. Anthony, w/ last colonoscopy, ~3yrs ago w/ Dr. Chavarria, see results below.       6/27/22 EGD / colonoscopy w/ Dr. Chavarria:    EGD: nonerosive gastritis, normal duodenum  gastritis and duodenitis, otherwise unremarkable gastric and duodenal bx.     Colonoscopy: External hemorrhoids. The colon was otherwise unremarkable (random colon biopsies were unremarkable).      PAST MEDICAL & SURGICAL HISTORY:  GERD (gastroesophageal reflux disease)  Gastric ulcer  Hypothyroidism  No significant past surgical history      FAMILY HISTORY: no fam hx of GI malignancy      Social History:  neg x 3     Home Medications:  Junel 1.5/30 oral tablet: 1 tab(s) orally once a day (17 Feb 2025 16:54)  levothyroxine 50 mcg (0.05 mg) oral tablet: 1 tab(s) orally once a day (17 Feb 2025 16:54)  pantoprazole 20 mg oral delayed release tablet: 2 tab(s) orally once a day (17 Feb 2025 16:54)    MEDICATIONS  (STANDING):  chlorhexidine 2% Cloths 1 Application(s) Topical <User Schedule>  levothyroxine 50 MICROGram(s) Oral daily    MEDICATIONS  (PRN):  acetaminophen     Tablet .. 650 milliGRAM(s) Oral every 6 hours PRN Temp greater or equal to 38C (100.4F), Mild Pain (1 - 3)  melatonin 3 milliGRAM(s) Oral at bedtime PRN Insomnia      Allergies  No Known Allergies      Review of Systems:   Constitutional:  No Fever, No Chills  ENT/Mouth:  No Hearing Changes,  No Difficulty Swallowing  Eyes:  No Eye Pain, No Vision Changes  Cardiovascular:  No Chest Pain, No Palpitations  Respiratory:  No Cough, No Dyspnea  Gastrointestinal:  As described in HPI  Musculoskeletal:  No Joint Swelling, No Back Pain  Skin:  No Skin Lesions, No Jaundice  Neuro:  No Syncope, No Dizziness  Heme/Lymph:  No Bruising, No Bleeding.          Physical Examination:  T(C): 37.1 (02-17-25 @ 17:14), Max: 37.1 (02-17-25 @ 17:14)  HR: 101 (02-17-25 @ 17:14) (101 - 122)  BP: 150/90 (02-17-25 @ 17:14) (127/89 - 150/90)  RR: 18 (02-17-25 @ 17:14) (18 - 18)  SpO2: 97% (02-17-25 @ 17:14) (97% - 99%)  Height (cm): 165.1 (02-17-25 @ 17:14)  Weight (kg): 64.9 (02-17-25 @ 17:14)      Constitutional: No acute distress.  Eyes:. Conjunctivae are clear, Sclera is non-icteric.  Ears Nose and Throat: The external ears are normal appearing,  Oral mucosa is pink and moist.  Respiratory:  No signs of respiratory distress. Lung sounds are clear bilaterally.  Cardiovascular:  S1 S2, Regular rate and rhythm.  GI: Abdomen is soft, symmetric, and non-tender without distention. There are no visible lesions or scars. Bowel sounds are present and normoactive in all four quadrants. No masses, hepatomegaly, or splenomegaly are noted.   Neuro: No Tremor, No involuntary movements  Skin: No rashes, No Jaundice.          Data:                        7.9    13.85 )-----------( 404      ( 17 Feb 2025 15:40 )             24.3     Hgb Trend:  7.9  02-17-25 @ 15:40      02-17    136  |  101  |  26[H]  ----------------------------<  112[H]  4.8   |  23  |  0.8    Ca    8.5      17 Feb 2025 15:40    TPro  5.4[L]  /  Alb  3.9  /  TBili  <0.2  /  DBili  x   /  AST  19  /  ALT  18  /  AlkPhos  41  02-17    Liver panel trend:  TBili <0.2   /   AST 19   /   ALT 18   /   AlkP 41   /   Tptn 5.4   /   Alb 3.9    /   DBili --      02-17              Radiology:    no abdominal imaging this admission. 49 y.o F / hx of hypothyroidism, h/o hemorrhoids, hx of PUD from NSAIDs, being evaluated for melena and concern for UGIB.     pt c/o 3 day hx of diarrhea, w/ reported dark tarry stools, lack of appetite, epigastric pain and nausea.   Pt reports using NSAIDs for hemorrhoidal pain that started around 3 weeks ago.   she was taking ibuprofen 400 mg q6-8h daily and alternating with tylenol.   she is on daily pantoprazole for GERD which she was taking.     she has 1  episode of dark, "blackish" like stool last Monday, which seem to have resolved until symptoms restarted 3 days ago.   today, she had 2 episodes of black stools.   Hb 7.9 in the ED today down from 13 (recent outpatient labs).     she is feeling weak but denies syncope or presyncope, has no acute vision changes, other sources of bleeding, chest pain, SOB, use of iron supplements, vomiting, unable to tolerate PO intake, fevers or chills.   ROS otherwise negative.       she is a pt of Dr. Anthony, w/ last colonoscopy, ~3yrs ago w/ Dr. Chavarria, see results below.       6/27/22 EGD / colonoscopy w/ Dr. Chavarria:    EGD: nonerosive gastritis, normal duodenum  gastritis and duodenitis, otherwise unremarkable gastric and duodenal bx.     Colonoscopy: External hemorrhoids. The colon was otherwise unremarkable (random colon biopsies were unremarkable).      PAST MEDICAL & SURGICAL HISTORY:  GERD (gastroesophageal reflux disease)  Gastric ulcer  Hypothyroidism  No significant past surgical history      FAMILY HISTORY: no fam hx of GI malignancy      Social History:  neg x 3     Home Medications:  Junel 1.5/30 oral tablet: 1 tab(s) orally once a day (17 Feb 2025 16:54)  levothyroxine 50 mcg (0.05 mg) oral tablet: 1 tab(s) orally once a day (17 Feb 2025 16:54)  pantoprazole 20 mg oral delayed release tablet: 2 tab(s) orally once a day (17 Feb 2025 16:54)    MEDICATIONS  (STANDING):  chlorhexidine 2% Cloths 1 Application(s) Topical <User Schedule>  levothyroxine 50 MICROGram(s) Oral daily    MEDICATIONS  (PRN):  acetaminophen     Tablet .. 650 milliGRAM(s) Oral every 6 hours PRN Temp greater or equal to 38C (100.4F), Mild Pain (1 - 3)  melatonin 3 milliGRAM(s) Oral at bedtime PRN Insomnia      Allergies  No Known Allergies      Review of Systems:   Constitutional:  No Fever, No Chills  ENT/Mouth:  No Hearing Changes,  No Difficulty Swallowing  Eyes:  No Eye Pain, No Vision Changes  Cardiovascular:  No Chest Pain, No Palpitations  Respiratory:  No Cough, No Dyspnea  Gastrointestinal:  As described in HPI  Musculoskeletal:  No Joint Swelling, No Back Pain  Skin:  No Skin Lesions, No Jaundice  Neuro:  No Syncope, No Dizziness  Heme/Lymph:  No Bruising, No Bleeding.          Physical Examination:  T(C): 37.1 (02-17-25 @ 17:14), Max: 37.1 (02-17-25 @ 17:14)  HR: 101 (02-17-25 @ 17:14) (101 - 122)  BP: 150/90 (02-17-25 @ 17:14) (127/89 - 150/90)  RR: 18 (02-17-25 @ 17:14) (18 - 18)  SpO2: 97% (02-17-25 @ 17:14) (97% - 99%)  Height (cm): 165.1 (02-17-25 @ 17:14)  Weight (kg): 64.9 (02-17-25 @ 17:14)      Constitutional: No acute distress.  Eyes:. Conjunctivae are clear, Sclera is non-icteric.  Ears Nose and Throat: The external ears are normal appearing,  Oral mucosa is pink and moist.  Respiratory:  No signs of respiratory distress. Lung sounds are clear bilaterally.  Cardiovascular:  S1 S2, Regular rate and rhythm.  GI: Abdomen is soft, symmetric, and non-tender without distention. There are no visible lesions or scars. Bowel sounds are present and normoactive in all four quadrants. No masses, hepatomegaly, or splenomegaly are noted.   Neuro: No Tremor, No involuntary movements  Skin: No rashes, No Jaundice.          Data:                        7.9    13.85 )-----------( 404      ( 17 Feb 2025 15:40 )             24.3     Hgb Trend:  7.9  02-17-25 @ 15:40      02-17    136  |  101  |  26[H]  ----------------------------<  112[H]  4.8   |  23  |  0.8    Ca    8.5      17 Feb 2025 15:40    TPro  5.4[L]  /  Alb  3.9  /  TBili  <0.2  /  DBili  x   /  AST  19  /  ALT  18  /  AlkPhos  41  02-17    Liver panel trend:  TBili <0.2   /   AST 19   /   ALT 18   /   AlkP 41   /   Tptn 5.4   /   Alb 3.9    /   DBili --      02-17              Radiology:    no abdominal imaging this admission.

## 2025-02-17 NOTE — ED PROVIDER NOTE - OBJECTIVE STATEMENT
49-year-old female past medical history of peptic ulcer disease history of GI bleed presents emergency department for black stools and pale complexion.  Notes 3 days of loose black stools.  No syncope PE however states feels fatigued and short of breath on exertion.  Last hemoglobin was 13 in January.

## 2025-02-17 NOTE — CONSULT NOTE ADULT - ASSESSMENT
49 y.o F / hx of hypothyroidism, h/o hemorrhoids, hx of PUD from NSAIDs, being evaluated for melena and concern for UGIB.     #melena  #UGIB  #NSAID use    concern for UGIB given pt's hx and presentation.  she has acute blood loss anemia in the setting of NSAID use w/ elevated BUN/Cr ratio.  tachycardic but otherwise HD stable. would benefit from optimization w/ IVF.     -2x18 g IVs  -IV pantoprazole gtt or 40 mg q12h  -monitor Hb q12h  -transfuse PRN to keep Hb > 7  -monitor for recurrent bleeding  -monitor HD stability   -supportive care and IVF per primary team  -clear liquid diet for now  -NPO after MN   -plan for EGD tomorrow  -in the meantime, if pt has recurrent bleeding w/ HD instability, get CTA stat and inform GI STAT  -avoid NSAIDs  -DVT ppx  -we will follow closely

## 2025-02-17 NOTE — ED PROVIDER NOTE - PROGRESS NOTE DETAILS
Case d/w GI Dr. Sibley - PPI drip - admit.  Case d/w Hospitalist Dr. Manuel - requesting ICU eval.  Case d/w Dr. Campo - admit to floor at this time.

## 2025-02-17 NOTE — CONSULT NOTE ADULT - NS ATTEST RISK PROBLEM GEN_ALL_CORE FT
pt w/ severe acute illness requiring decision on intervention and management of drug therapy. reviewed prior records and independently interpreted procedure and test results. discussed w/ primary team.

## 2025-02-17 NOTE — ED ADULT NURSE NOTE - NSFALLUNIVINTERV_ED_ALL_ED
Bed/Stretcher in lowest position, wheels locked, appropriate side rails in place/Call bell, personal items and telephone in reach/Instruct patient to call for assistance before getting out of bed/chair/stretcher/Non-slip footwear applied when patient is off stretcher/[place] to call system/Physically safe environment - no spills, clutter or unnecessary equipment/Purposeful proactive rounding/Room/bathroom lighting operational, light cord in reach

## 2025-02-17 NOTE — H&P ADULT - HISTORY OF PRESENT ILLNESS
50 y/o F PMHx hypothyroidism, h/o hemorrhoids on OCPs, c/o 3 day h/o diarrhea, dark,tarry stools, lack of appetite, "stomach soreness," and nausea. Pt reports 1  episode of dark, "blackish" like stool last Monday, which seem to have resolved until sxs restarted 3 days ago. Pr reports Gi doctor is Dr. Cedillo, w/ last colonoscopy, ~3yrs ago w/ Dr. Chavarria, demonstrating hemorrhoids      Pt denies acute vision changes, visible signs of bleeding, chest pain, SOB, use of iron supplements, vomiting, unable to tolerate PO intake, fevers,

## 2025-02-17 NOTE — H&P ADULT - NS ATTEND AMEND GEN_ALL_CORE FT
48 y/o F PMHx hypothyroidism, PUD,  c/o 3 day h/o diarrhea, dark,tarry stools, lack of appetite, "stomach soreness," and nausea, found to have a hemoglobin of 7.9, down from 13 baseline concerning for upper GIB. Patient reports taking Alleve more often lately due to headaches. Reports being diagnosed with PUD in the past with negative H pylori testing.    General: NAD, comfortable in bed  HEENT: NCAT  Lungs: No increased WOB  CVS: RRR  Abdomen: , non-distended, moderate tenderness to palpation  Extremities: no edema    #Upper GIB  #Acute blood loss anemia  -PPI infusion  -TS, 2 large bore IVs  -Hold ASA/alleve/NSAIDS  -Give 1 U of PRBC due to active bleeding  -Repeat H+H at 10pm and 4 am  -CLD, NPO@0000 for upper EGD tomorrow as per GI, recs appreciated  -Continue OCPs to avoid breakthrough bleeding which will worsen anemia  -Stop IVF as they will worsen anemia with dilutional effect    Code full  PPx: SCDs, avoid AC due to active bleeding    Rest per PA note

## 2025-02-17 NOTE — ED ADULT NURSE NOTE - SUICIDE SCREENING QUESTION 2
"----- Message from Reanna Rueda sent at 8/1/2022  8:08 AM CDT -----  Consult/Advisory:           Name Of Caller: self    Contact Preference?: [phone]    What is the nature of the call?: called to advise that updated address and received a message in portal however unable to retrieve the message to read it. Inquiring about when she needs to redo  test.           Additional Notes:  "Thank you for all that you do for our patients'"       " No

## 2025-02-17 NOTE — ED PROVIDER NOTE - ATTENDING APP SHARED VISIT CONTRIBUTION OF CARE
Patient is a 49-year-old female history of peptic ulcer disease 3 days of loose black stool.  No syncope but fatigued and shortness of breath on exertion.  History of anemia status post iron infusions.  Hemoglobin in January 13.1.    Exam: Pale conjunctivae, pale skin, regular rate, lungs clear, soft nontender abdomen, no acute distress  Plan: Labs, chest x-ray, EKG, transfuse as indicated Patient is a 49-year-old female history of peptic ulcer disease 3 days of loose black stool.  No syncope but fatigued and shortness of breath on exertion.  History of anemia status post iron infusions.  Hemoglobin in January 13.1.    Exam: Pale conjunctivae, pale skin, regular rate, lungs clear, soft nontender abdomen, no acute distress  Plan: Labs, chest x-ray, EKG, transfuse as indicated    Number of diagnoses or management options:  [x] Referral or consultation made: GI    Complexity of data reviewed:  [ ] Decision made to obtain old record(s) or additional history from the family, caretaker, or other source  [x] Laboratory test(s) ordered and/or reviewed  [x] Independent interpretation of EKG by Dr. Wilfrid Perry: NSR @ 102  [x] Independent interpretation of Radiology by Dr. Wilfrid Perry: ACE  [ ] I, Wilfrid Perry, had a discussion with    Risk (Management Options):  [ ] High: emergency surgery; monitored drug therapy; controlled parenteral therapy; decision for DNR

## 2025-02-17 NOTE — H&P ADULT - NSHPSOCIALHISTORY_GEN_ALL_CORE
pt denies h/o current smoking ( quit "many years ago"  pt denies h/o alcohol use  pt denies h/o illicit drug use

## 2025-02-18 ENCOUNTER — RESULT REVIEW (OUTPATIENT)
Age: 50
End: 2025-02-18

## 2025-02-18 ENCOUNTER — TRANSCRIPTION ENCOUNTER (OUTPATIENT)
Age: 50
End: 2025-02-18

## 2025-02-18 VITALS
SYSTOLIC BLOOD PRESSURE: 134 MMHG | DIASTOLIC BLOOD PRESSURE: 84 MMHG | OXYGEN SATURATION: 100 % | TEMPERATURE: 98 F | HEART RATE: 85 BPM | RESPIRATION RATE: 18 BRPM

## 2025-02-18 LAB
ANION GAP SERPL CALC-SCNC: 7 MMOL/L — SIGNIFICANT CHANGE UP (ref 7–14)
BUN SERPL-MCNC: 16 MG/DL — SIGNIFICANT CHANGE UP (ref 10–20)
CALCIUM SERPL-MCNC: 8.4 MG/DL — SIGNIFICANT CHANGE UP (ref 8.4–10.5)
CHLORIDE SERPL-SCNC: 104 MMOL/L — SIGNIFICANT CHANGE UP (ref 98–110)
CO2 SERPL-SCNC: 26 MMOL/L — SIGNIFICANT CHANGE UP (ref 17–32)
CREAT SERPL-MCNC: 0.8 MG/DL — SIGNIFICANT CHANGE UP (ref 0.7–1.5)
EGFR: 89 ML/MIN/1.73M2 — SIGNIFICANT CHANGE UP
FERRITIN SERPL-MCNC: 13 NG/ML — SIGNIFICANT CHANGE UP (ref 13–330)
FOLATE SERPL-MCNC: >20 NG/ML — SIGNIFICANT CHANGE UP
GLUCOSE SERPL-MCNC: 90 MG/DL — SIGNIFICANT CHANGE UP (ref 70–99)
HCG UR QL: NEGATIVE — SIGNIFICANT CHANGE UP
HCT VFR BLD CALC: 25.5 % — LOW (ref 37–47)
HGB BLD-MCNC: 8.6 G/DL — LOW (ref 12–16)
IRON SATN MFR SERPL: 21 UG/DL — LOW (ref 35–150)
IRON SATN MFR SERPL: 6 % — LOW (ref 15–50)
MCHC RBC-ENTMCNC: 28.7 PG — SIGNIFICANT CHANGE UP (ref 27–31)
MCHC RBC-ENTMCNC: 33.7 G/DL — SIGNIFICANT CHANGE UP (ref 32–37)
MCV RBC AUTO: 85 FL — SIGNIFICANT CHANGE UP (ref 81–99)
NRBC BLD AUTO-RTO: 0 /100 WBCS — SIGNIFICANT CHANGE UP (ref 0–0)
PLATELET # BLD AUTO: 310 K/UL — SIGNIFICANT CHANGE UP (ref 130–400)
PMV BLD: 9.4 FL — SIGNIFICANT CHANGE UP (ref 7.4–10.4)
POTASSIUM SERPL-MCNC: 4.6 MMOL/L — SIGNIFICANT CHANGE UP (ref 3.5–5)
POTASSIUM SERPL-SCNC: 4.6 MMOL/L — SIGNIFICANT CHANGE UP (ref 3.5–5)
RBC # BLD: 3 M/UL — LOW (ref 4.2–5.4)
RBC # FLD: 15.8 % — HIGH (ref 11.5–14.5)
SODIUM SERPL-SCNC: 137 MMOL/L — SIGNIFICANT CHANGE UP (ref 135–146)
TIBC SERPL-MCNC: 335 UG/DL — SIGNIFICANT CHANGE UP (ref 220–430)
TRANSFERRIN SERPL-MCNC: 276 MG/DL — SIGNIFICANT CHANGE UP (ref 200–360)
TSH SERPL-MCNC: 3.75 UIU/ML — SIGNIFICANT CHANGE UP (ref 0.27–4.2)
UIBC SERPL-MCNC: 314 UG/DL — SIGNIFICANT CHANGE UP (ref 110–370)
VIT B12 SERPL-MCNC: 858 PG/ML — SIGNIFICANT CHANGE UP (ref 232–1245)
WBC # BLD: 7.89 K/UL — SIGNIFICANT CHANGE UP (ref 4.8–10.8)
WBC # FLD AUTO: 7.89 K/UL — SIGNIFICANT CHANGE UP (ref 4.8–10.8)

## 2025-02-18 PROCEDURE — 88305 TISSUE EXAM BY PATHOLOGIST: CPT | Mod: 26

## 2025-02-18 PROCEDURE — 99232 SBSQ HOSP IP/OBS MODERATE 35: CPT

## 2025-02-18 PROCEDURE — 43239 EGD BIOPSY SINGLE/MULTIPLE: CPT

## 2025-02-18 PROCEDURE — 88312 SPECIAL STAINS GROUP 1: CPT | Mod: 26

## 2025-02-18 RX ORDER — SODIUM CHLORIDE 9 G/1000ML
1000 INJECTION, SOLUTION INTRAVENOUS
Refills: 0 | Status: DISCONTINUED | OUTPATIENT
Start: 2025-02-18 | End: 2025-02-18

## 2025-02-18 RX ADMIN — Medication 10 MG/HR: at 05:12

## 2025-02-18 RX ADMIN — Medication 650 MILLIGRAM(S): at 17:30

## 2025-02-18 RX ADMIN — Medication 650 MILLIGRAM(S): at 18:00

## 2025-02-18 RX ADMIN — Medication 50 MICROGRAM(S): at 05:11

## 2025-02-18 RX ADMIN — Medication 1 APPLICATION(S): at 05:11

## 2025-02-18 NOTE — DISCHARGE NOTE NURSING/CASE MANAGEMENT/SOCIAL WORK - PATIENT PORTAL LINK FT
You can access the FollowMyHealth Patient Portal offered by [facility] by registering at the following website: [email or web address]. By joining [employer]’s FollowMyHealth portal, you will also be able to view your health information using other applications (apps) compatible with our system.

## 2025-02-18 NOTE — DISCHARGE NOTE NURSING/CASE MANAGEMENT/SOCIAL WORK - FINANCIAL ASSISTANCE
Your Child's Health  Girls over 12      Ana Maria Barillas  September 10, 2020    Visit Vitals  /66   Pulse 88   Ht 5' 5.5\" (1.664 m)   Wt 69.8 kg   LMP 08/12/2020   BMI 25.20 kg/m²     Weight: 153.8 lbs      Adolescence begins about age 10 in girls and ends somewhere in the early twenties.  During this time many changes occur.    BODY CHANGES:  There is no other time of life, except in the first year, when your body grows so rapidly.  This is called a \"growth spurt\" and usually precedes and accompanies changes in your body shape as well as changes in your sexual organs.  Bones and muscles grow rapidly, fat is added and distributed in an adult manner, and the result is a rapid increase in height and weight.  It may even take a while for your coordination and skills to catch up with your rapidly changing body.  Your hands and feet are the first to grow, followed by lengthening of your arms and legs.  Finally, your spine grows, and your rib cage and your pelvis widen.    Various hormones secreted by the endocrine glands lead to development.  The first major change is breast development.  The second is usually pubic hair (hair between the legs), followed by body odor, hair in the armpits, and finally menstruation (periodic bleeding or periods).  In some families, the hair and odor precede the development of the breast tissue.  The whole process takes about two years, although in some cases it may take as long as four or five years.    SEXUAL DEVELOPMENT AND MENSTRUATION:  Breast development may normally begin at any time after the age of eight.  One side may begin to grow several months before the other, and unevenness is common, especially in the first year.  About two-thirds of adult women are somewhat unequal from one side to the other; small differences are insignificant, but large differences may cause considerable worry later.  Bloody or milky discharge from the breast is never normal.  Please let us  know if this occurs.    Girls whose breasts begin to develop early will usually grow early, as compared to their friends. Girls who begin to develop later than average (14 years and up) will continue to grow after their friends have stopped.    The second sign of external development is growth of pubic hair.  The third is growth of underarm hair, and the last is menstruation.  Menstruation occurs about two years after breast development starts.  It may begin earlier or, in rare cases, as much as five years later.  Let us know if you have monthly lower abdominal discomfort or pain without any blood.  Many girls will have a clear mucous vaginal discharge prior to their periods.  This is normal, as long as it doesn't itch, burn, or have a strong odor.    Often, menstrual periods occur irregularly at first.  With time, they usually become more regular.  \"Cramps\" can occur in the lower abdomen or in the back.  They are usually mild, and sometimes increase in severity with age. Cramps are the result of an excess of the muscle contraction chemical in the uterus (womb).  They are not a sign of weakness or low pain tolerance.  Acetaminophen or ibuprofen are often helpful.  The newer ibuprofen medicines help 85 percent of girls.  Sometimes a prescribed medication may be needed if these over-the-counter medications are not adequate.     Some women experience Premenstrual Syndrome (PMS), which is characterized by bloating, swollen or tender breasts, headaches, depression, or irritability.  These symptoms occur prior to and at the beginning of a menstrual period.  This is unusual in teens and should be discussed with your doctor if it occurs.    Tampons are generally safe, comfortable, and easy to use, but adolescents who use tampons should be aware of the small risk of Toxic Shock Syndrome.  This results from a bacterial infection that may occur in tampon users who leave tampons in for more than four hours.  Symptoms include  high fever, headache, vomiting, diarrhea, rash, and shock.  Should these symptoms occur while you are using a tampon, you should remove it and notify your doctor immediately.  If you would like to use tampons but feel uncomfortable asking your mother how they are properly used, a female healthcare worker can demonstrate this for you.    There is a lot of normal variability in period timing.  However, you should make an appointment if the time between the start of one period and the start of the next is less than three weeks, or if the time between periods is more than 3 months.  Other signs that an exam may be needed are very short periods of two days or less, uncontrollable cramps, or any risk of pregnancy or venereal disease.    Once you start having periods, it is physically possible to get pregnant and have a baby.  Becoming pregnant is not a decision to be taken lightly.  There is no sure way to avoid pregnancy or venereal disease except to avoid sexual contact.  If 100 girls take the best known birth control and follow the directions perfectly, one will get pregnant each year.     Girls get venereal diseases from boys more easily than boys get them from girls, so girls have to be more careful.  No matter what anybody says, AIDS is fatal and a cure in the near future is not likely.  Please don't take chances.    SKIN PROBLEMS:  Acne affects 90 percent of teens.  It often starts earlier in girls than in boys, and it is generally at its worst around 15-16 years of age.  There are a lot of exceptions.  Call us if the over-the-counter medications are not helping.  Remember that washing too frequently, scrubbing, or drying vigorously may convert blackheads into white or red pimples.  Benzoyl peroxide is the most effective ingredient of those available over the counter.    Most of us are born without brown spots on our skin (also called moles or wens).  By the time we are 20 years old, the average person has about 40  such spots.  They are normal as long as they remain brown, have a sharp edge, and remain smaller than the diameter of a pencil eraser.  If you have moles with any other characteristics, please show them to your doctor.  We would especially like to see moles that have been present since birth.  Please also let us know if there is skin cancer in your family.  Skin cancer almost never occurs under ten years of age, but there are a few cases in the teens.  The most dangerous skin cancers occur in people who have had sunburn, especially repeatedly, which is why we recommend sunscreens and protective clothing.     DIET:  It is important to maintain a balanced diet.  Calcium, most readily available in dairy products, is particularly important for strong bones.  If you cannot drink 3 glasses of milk a day without stomach aches, please tell us.  Limit your intake of fatty snacks (chips, fries, etc.).  Avoid eating in front of the TV or while preoccupied with music or videos.  You often eat more than you really want when distracted.  It is always important to eat as wide a variety of fruits and vegetables as you can.  If you drink less than 32 oz of milk per day, you should take a multivitamin with 600 International Units vitamin D.    Adolescent girls also need iron in their diets.  Menstruation depletes your iron supply.  Iron, found in red meats and dark green vegetables, is necessary as part of your daily diet to prevent anemia.    SAFETY:  Car accidents and gunshot wounds are major killers in your age range.  Your reflexes are quick, but seatbelts are still necessary when you ride in a car.  \"Friends\" who drive recklessly, drink and drive, use drugs, or play with guns or knives are being unreasonable and dangerous.  No one wants to be called a \"chicken\", but standing up to being called a chicken in front of others is braver and safer.    Backpacks may cause neck strain or weakening of the arms if they exceed 15 percent   of your weight or are carried for long periods over just one shoulder.    SUBSTANCE ABUSE:  [place] unfortunately leads the nation in drinking and binge drinking.  Remember that every beer is 120-150 calories.  If you want information about alcohol, tobacco, or drugs for yourself or a friend, please ask us.  We will be glad to talk with you or mail information to you.  It is very hard to stop smoking once you start.  Please don't start.    RESPECT AND DATING:  When boys and girls get together, both the girls and the boys are trying to make a good impression.  Some people can make a good impression just by being themselves, while others may try a little too hard to please.  When you begin dating, you will probably want to date with a group of friends or date where an adult is within calling distance.  You are entitled to choose whom you would like to date and where.  At your age, most boys are becoming physically stronger.  Some have come to believe from stories they have heard that girls like to be treated roughly.  NO ONE HAS THE RIGHT TO HURT YOU.    Watch how your date treats his friends and family.  If he is mean to everyone else, be careful.  Despite what you see on TV or hear from your friends, most girls do not spend a night alone with a boy until after high school.  You are a good person who should have the confidence to expect friends (male or female) to treat you with respect.  Please tell your parents or tell us if someone has hurt you.    MEDICATIONS:  Once you are 12 or older, you can generally take adult doses of over-the-counter medications. However, you should not take extra-strength doses of acetaminophen (for example, 500 mg Tylenol tablets) unless you weigh more than 140 pounds.    MEDICATION FOR FEVER OR PAIN:   Acetaminophen liquid (e.g., Tylenol or Tempra) may be given every four hours as needed for pain or fever.  Acetaminophen liquid is less concentrated than the infant dropper bottle type.   Be sure to check which product CONCENTRATION you are using.      CHILDREN’S Tylenol/Acetaminophen  (160 MG/5 mL)    Child’s Weight:  Dose:  36 - 47 pounds:    240 mg (7.5 mL (1 1/2 Teaspoons))  48 - 59 pounds:    320 mg (10.0 mL (2 Teaspoons))  60 - 71 pounds:    400 mg (12.5 mL (2 1/2 Teaspoons))  72 - 95 pounds:    480 mg (15.0 mL (3 Teaspoons))  Greater than 96 pounds:   640 mg (20.0 mL (4 Teaspoons))    CHILDREN’S Tylenol/Acetaminophen MELTAWAYS ( 80 MG tablets)    Child’s Weight:  Dose:  36 - 47 pounds:    240 mg (3 meltaway tablets)  48 - 59 pounds:    320 mg (4 meltaway tablets)  60 - 71 pounds:    400 mg (5 meltaway tablets)  72 - 95 pounds:    480 mg (6 meltaway tablets)  Greater than 96 pounds:   640 mg (8 meltaway tablets)    Brayden () Tylenol/Acetaminophen MELTAWAYS (160 MG tablets)    Child’s Weight:  Dose:  36 - 47 pounds:    240 mg (1 1/2 meltaway tablets)  48 - 59 pounds:    320 mg (2 meltaway tablets)  60 - 71 pounds:    400 mg (2 1/2 meltaway tablets)  72 - 95 pounds:    480 mg (3 meltaway tablets)  Greater than 96 pounds:   640 mg (4 meltaway tablets)    CHILDREN'S Ibuprofen (e.g., Advil or Motrin) may be given every six hours as needed for pain or fever.    48 - 59 pounds:                       200 mg (2 Teaspoons)  60 - 71 pounds:                       250 mg (2 1/2 Teaspoons)  72 - 95 pounds:                       300 mg (3 Teaspoons)    NEXT VISIT: 1 year      Thank you for entrusting your care to [facility].   [facility] provides services at a reduced cost to those who are determined to be eligible through [facility]’s financial assistance program. Information regarding [facility]’s financial assistance program can be found by going to [email or web address] or by calling [phone].

## 2025-02-18 NOTE — PROGRESS NOTE ADULT - ASSESSMENT
50 y/o F PMHx hypothyroidism, h/o hemorrhoids on OCPs, c/o 3 day h/o diarrhea, dark,tarry stools, lack of appetite, "stomach soreness," and nausea. Pt reports 1  episode of dark, "blackish" like stool last Monday, which seem to have resolved until sxs restarted 3 days ago. Pr reports Gi doctor is Dr. Cedillo, w/ last colonoscopy, ~3yrs ago w/ Dr. Chavarria, demonstrating hemorrhoids ( no polyps)     Acute blood loss anemia 2/2 melena  S/p 1u prbc, h/h stable, c/w ppi gtt, avoid nsaids, transfuse for hgb <7, tele, ivfs, npo for EGD today, GI on board.       #Hypothyroidism  - c/w levothyroxine   - f/u TSH in AM

## 2025-02-18 NOTE — PRE-OP CHECKLIST - HAND OFF
Post Injection Care:    Medications used in your injections today included:  `````````````````````````````````````````````````````````````````````````````````````````````````````````````````````````````````````````````````````````````````````````````````````````````````````````````````````````````````````````````````````````````````````````````````````````````````````````````````````````````````````````````````````````````````````````````````````````````````````````````````````````````````````````````````````````````````````````````````````````````````````````````````````````````````````````````````````````````````````````````````````````````````````````````````````````````````````````````````````````````````````````````````````````````````````````````````````````````````````````````````````````````````````````````````````````````````````````````````````````````````````````````````````````````````````````````````````````````````````````````````````````````````````````````````````````````````````````````````````````````````````````````````````````````````````````````````````````````````````````````````````````````````````````````````````````````````````````````````````````````````````````````````````````````````````````````````````````````````````````````````````````````````````````````````````````````````````````````````````````````````````````````````````````````````````````````````````````````````````  - Kenalog (a long acting form of cortisone)    The sites of the injections may be sore or throbbing for the next 24 to 72 hours. We recommend that you rest the areas for a couple days. Apply ice to the injection sites for 20 minutes every 4 hours to help decrease the pain. In addition, you may benefit from taking acetaminophen (Tylenol™) to help reduce the pain.    If you have increased pain, swelling, redness, fever, or chills, please call immediately or go to the ER.    Feel free to call us at [phone] and  ask for Rheumatology with any questions.      Diabetic Warning  If you are diabetic and using insulin, this injection may elevate your blood sugar for the next one to five days. Please monitor your sugars closely and if they fail to return to acceptable levels, please contact your primary care physician.      Aspiration & Injection Aftercare  Please contact Dr Prince's office with questions or concerns at  ([place]) or  ([place])    Recurring Pain:  Injections are usually done using a local anesthetic such as lidocaine and cortisone. Lidocaine gives an immediate effects with the numbing effect ne usually lasting about 1-2 hours.  The cortisone usually takes 24-48 hours to take affect.    Rest the Area  Be careful with the affected area or joint, usually the injected medication causes the area to feel numb.  Because you may not feel the pain, the first 72 hours post injection it is very easy to cause injury to the area.  Mild essential activities for the next 2 weeks.    Watch of Infection  Although every precaution has been taken to prevent infection, be alert for the following signs: Fever above 100 degrees, increase warmth and redness at the site of injection, swelling of the area. If any of these symptoms develop, call our office immediately.  Remove bandage at bedtime, if bandage comes off-no need to apply another.    Apply ice to the area every 4 hours for 20 minutes at a time for 2 days.  Make sure there is a barrier between the ice pack and skin.     Unit RN to OR RN

## 2025-02-18 NOTE — PROGRESS NOTE ADULT - SUBJECTIVE AND OBJECTIVE BOX
Pt seen and examined at bedside. No new complaints. Npo for egd    T(C): 36.9 (02-18-25 @ 05:00), Max: 37.1 (02-17-25 @ 17:14)  HR: 92 (02-18-25 @ 05:00) (92 - 122)  BP: 108/70 (02-18-25 @ 05:00) (108/70 - 150/90)  RR: 18 (02-18-25 @ 05:00) (18 - 18)  SpO2: 98% (02-18-25 @ 05:00) (96% - 99%)    CONSTITUTIONAL: Well groomed, no apparent distress  EYES: PERRLA and symmetric, EOMI, No conjunctival or scleral injection, non-icteric  ENMT: Oral mucosa with moist membranes. Normal dentition; no pharyngeal injection or exudates             NECK: Supple, symmetric and without tracheal deviation   RESP: No respiratory distress, no use of accessory muscles; CTA b/l, no WRR  CV: RRR, +S1S2, no MRG; no JVD; no peripheral edema  GI: Soft, NT, ND, no rebound, no guarding; no palpable masses; no hepatosplenomegaly; no hernia palpated  LYMPH: No cervical LAD or tenderness; no axillary LAD or tenderness; no inguinal LAD or tenderness  MSK: Normal gait; No digital clubbing or cyanosis; examination of the (head/neck/spine/ribs/pelvis, RUE, LUE, RLE, LLE) without misalignment,            Normal ROM without pain, no spinal tenderness, normal muscle strength/tone  SKIN: No rashes or ulcers noted; no subcutaneous nodules or induration palpable  NEURO: CN II-XII intact; normal reflexes in upper and lower extremities, sensation intact in upper and lower extremities b/l to light touch   PSYCH: Appropriate insight/judgment; A+O x 3, mood and affect appropriate, recent/remote memory intact                          8.6    7.89  )-----------( 310      ( 18 Feb 2025 07:50 )             25.5     02-18    137  |  104  |  16  ----------------------------<  90  4.6   |  26  |  0.8    Ca    8.4      18 Feb 2025 07:50    TPro  5.4[L]  /  Alb  3.9  /  TBili  <0.2  /  DBili  x   /  AST  19  /  ALT  18  /  AlkPhos  41  02-17

## 2025-02-18 NOTE — DISCHARGE NOTE PROVIDER - NSDCMRMEDTOKEN_GEN_ALL_CORE_FT
Junel 1.5/30 oral tablet: 1 tab(s) orally once a day  levothyroxine 50 mcg (0.05 mg) oral tablet: 1 tab(s) orally once a day  Protonix 40 mg oral delayed release tablet: 1 tab(s) orally 2 times a day

## 2025-02-18 NOTE — DISCHARGE NOTE PROVIDER - NSDCCPCAREPLAN_GEN_ALL_CORE_FT
PRINCIPAL DISCHARGE DIAGNOSIS  Diagnosis: Gastric ulcer  Assessment and Plan of Treatment: Follow up with GI: Dr Anthony within 1 week, Protonix 40mg orally twice/day,      SECONDARY DISCHARGE DIAGNOSES  Diagnosis: Anemia  Assessment and Plan of Treatment: S/P transfusion 1 unit PRBC, H/H stable

## 2025-02-18 NOTE — DISCHARGE NOTE PROVIDER - HOSPITAL COURSE
1 y/o F PMHx hypothyroidism, h/o hemorrhoids on OCPs, c/o 3 day h/o diarrhea, dark,tarry stools, lack of appetite, "stomach soreness," and nausea. Pt reports 1  episode of dark, "blackish" like stool last Monday, which seem to have resolved until sxs restarted 3 days ago. Pr reports Gi doctor is Dr. Cedillo, w/ last colonoscopy, ~3yrs ago w/ Dr. Chavarria, demonstrating hemorrhoids ( no polyps)     # Acute blood loss anemia 2/2 melena  S/p 1u prbc, h/h stable, recieved ppi gtt, avoid nsaids, transfuse for hgb <7, tele, ivfs, GI consulted    EGD result:  Impressions:    Normal mucosa in the whole esophagus.    Two clean based ulcers in the antrum. (Biopsy).    Erythema in the stomach compatible with non-erosive gastritis. (Biopsy).    Normal mucosa in the whole examined duodenum.           #Hypothyroidism  - c/w levothyroxine        48 y/o F PMHx hypothyroidism, h/o hemorrhoids on OCPs, c/o 3 day h/o diarrhea, dark,tarry stools, lack of appetite, "stomach soreness," and nausea. Pt reports 1  episode of dark, "blackish" like stool last Monday, which seem to have resolved until sxs restarted 3 days ago. Pr reports Gi doctor is Dr. Cedillo, w/ last colonoscopy, ~3yrs ago w/ Dr. Chavarria, demonstrating hemorrhoids ( no polyps)     # Acute blood loss anemia 2/2 melena  S/p 1u prbc, h/h stable, recieved ppi gtt, avoid nsaids, transfuse for hgb <7, tele, ivfs, GI consulted    EGD result:  Impressions:    Normal mucosa in the whole esophagus.    Two clean based ulcers in the antrum. (Biopsy).    Erythema in the stomach compatible with non-erosive gastritis. (Biopsy).    Normal mucosa in the whole examined duodenum.           #Hypothyroidism  - c/w levothyroxine

## 2025-02-18 NOTE — CHART NOTE - NSCHARTNOTEFT_GEN_A_CORE
.  Patient reports she takes Xanax 2 mg every night for sleep and anxiousness.  Xanax was not ordered on admission but patient has her prescription bottle bedside.  Patient not sedated and VSS so will order her routine dose of Xanax 2 mg po qhs PRN insomnia and anxiousness.          Patient also admitted to r/o UGIB and GI note reviewed and recommended to hold NSAIDs, give IVF's, transfuse to keep Hbg > 7 and keep NPO after midnight for planned EGD tomorrow.      Patient had repeat CBC @ 7 pm tonight.                            7.3 <--(7.9)   11.37 )------------------( 334      ( 17 Feb 2025 18:58 )             22.1 <--(24.3)        Vital Signs Last 24 Hrs  T(C): 36.9 (17 Feb 2025 21:00), Max: 37.1 (17 Feb 2025 17:14)  T(F): 98.4 (17 Feb 2025 21:00), Max: 98.8 (17 Feb 2025 17:14)  HR: 94 (17 Feb 2025 21:00) (94 - 122)  BP: 126/82 (17 Feb 2025 21:00) (126/82 - 150/90)  RR: 18 (17 Feb 2025 21:00) (18 - 18)  SpO2: 96% (17 Feb 2025 21:00) (96% - 99%)  Parameters below as of 17 Feb 2025 16:31  Patient On (Oxygen Delivery Method): room air        Patient remains asymptomatic with her acute blood loss anemia.  Will repeat CBC in am and keep NPO after midnight for EGD tomorrow.
patient for EGD today
Please refer to sunrise results section for EGD findings and recommendations  -patient to followup with Dr. Anthony
Repeat HGB is now 7.1, as per sign out from day hospitalist, will order transfusion (sign out states keep HGB greater then 8)

## 2025-02-18 NOTE — DISCHARGE NOTE PROVIDER - CARE PROVIDER_API CALL
no
Tonia Anthony  Gastroenterology  [address] [place] [id number]  Phone: [phone]  Fax: [phone]  Follow Up Time: 1 week

## 2025-02-20 LAB — SURGICAL PATHOLOGY STUDY: SIGNIFICANT CHANGE UP

## 2025-02-24 DIAGNOSIS — T39.395A ADVERSE EFFECT OF OTHER NONSTEROIDAL ANTI-INFLAMMATORY DRUGS [NSAID], INITIAL ENCOUNTER: ICD-10-CM

## 2025-02-24 DIAGNOSIS — K64.4 RESIDUAL HEMORRHOIDAL SKIN TAGS: ICD-10-CM

## 2025-02-24 DIAGNOSIS — K29.70 GASTRITIS, UNSPECIFIED, WITHOUT BLEEDING: ICD-10-CM

## 2025-02-24 DIAGNOSIS — K25.9 GASTRIC ULCER, UNSPECIFIED AS ACUTE OR CHRONIC, WITHOUT HEMORRHAGE OR PERFORATION: ICD-10-CM

## 2025-02-24 DIAGNOSIS — D62 ACUTE POSTHEMORRHAGIC ANEMIA: ICD-10-CM

## 2025-02-24 DIAGNOSIS — K21.9 GASTRO-ESOPHAGEAL REFLUX DISEASE WITHOUT ESOPHAGITIS: ICD-10-CM

## 2025-02-24 DIAGNOSIS — E03.9 HYPOTHYROIDISM, UNSPECIFIED: ICD-10-CM

## 2025-06-04 ENCOUNTER — RX RENEWAL (OUTPATIENT)
Age: 50
End: 2025-06-04

## 2025-06-24 ENCOUNTER — NON-APPOINTMENT (OUTPATIENT)
Age: 50
End: 2025-06-24

## 2025-07-22 NOTE — ASU PATIENT PROFILE, ADULT - CAREGIVER
[FreeTextEntry1] : Ms. CORETTA SALAS 78 year female with a PMH Hypothyroidism, Hyperlipidemia, vitamin D,  osteopenia  h/o breast cancer(s/p b/l mastectomy with reconstruction, s/p breast implant) GERD, hemangioma, IBS, adrenal adenoma, h/o kidney stone present to the office for a f/u Result of the blood test discussed with the patient in detail.  Patient has  well controlled Hypothyroidism continue Synthroid 88 mcg qd Anxiety continue klonopin 1mg tid, prozac 10mg, effexor 37.5 Hyperlipidemia - continue low cholestrol diet Postnasal drip recommend to use  flonase GERD continue pepcid Urine incontinence continue myrbetrig- f/u with urologist. Will repeat u/a today. Has some blood in the urine Recommend to f/u with a surgeon for a gallbladder adenomyomatosis(copy of the MRI report was provided to the patient) RTC for a f/u in 3 mo
No